# Patient Record
Sex: MALE | Race: WHITE | Employment: FULL TIME | URBAN - METROPOLITAN AREA
[De-identification: names, ages, dates, MRNs, and addresses within clinical notes are randomized per-mention and may not be internally consistent; named-entity substitution may affect disease eponyms.]

---

## 2018-08-29 ENCOUNTER — APPOINTMENT (OUTPATIENT)
Dept: GENERAL RADIOLOGY | Age: 63
End: 2018-08-29
Attending: EMERGENCY MEDICINE
Payer: OTHER MISCELLANEOUS

## 2018-08-29 ENCOUNTER — HOSPITAL ENCOUNTER (EMERGENCY)
Age: 63
Discharge: HOME | End: 2018-08-29
Attending: EMERGENCY MEDICINE | Admitting: EMERGENCY MEDICINE
Payer: OTHER MISCELLANEOUS

## 2018-08-29 VITALS
HEIGHT: 76 IN | TEMPERATURE: 98 F | SYSTOLIC BLOOD PRESSURE: 118 MMHG | DIASTOLIC BLOOD PRESSURE: 68 MMHG | OXYGEN SATURATION: 93 % | RESPIRATION RATE: 18 BRPM | BODY MASS INDEX: 32.71 KG/M2 | HEART RATE: 76 BPM | WEIGHT: 268.6 LBS
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
## 2018-08-29 DIAGNOSIS — M54.2 NECK PAIN: Primary | ICD-10-CM

## 2018-08-29 PROCEDURE — 72050 X-RAY EXAM NECK SPINE 4/5VWS: CPT

## 2018-08-29 PROCEDURE — 99282 EMERGENCY DEPT VISIT SF MDM: CPT
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
## 2023-11-12 ENCOUNTER — APPOINTMENT (OUTPATIENT)
Facility: HOSPITAL | Age: 68
DRG: 308 | End: 2023-11-12
Payer: MEDICARE

## 2023-11-12 ENCOUNTER — HOSPITAL ENCOUNTER (INPATIENT)
Facility: HOSPITAL | Age: 68
LOS: 4 days | Discharge: NURSING, REHAB OR LONG-TERM CARE | DRG: 308 | End: 2023-11-16
Attending: EMERGENCY MEDICINE | Admitting: HOSPITALIST
Payer: MEDICARE

## 2023-11-12 DIAGNOSIS — I48.91 ATRIAL FIBRILLATION, UNSPECIFIED TYPE (HCC): ICD-10-CM

## 2023-11-12 DIAGNOSIS — J44.1 COPD EXACERBATION (HCC): ICD-10-CM

## 2023-11-12 DIAGNOSIS — I48.91 ATRIAL FIBRILLATION WITH RAPID VENTRICULAR RESPONSE (HCC): Primary | ICD-10-CM

## 2023-11-12 DIAGNOSIS — I48.20 CHRONIC ATRIAL FIBRILLATION WITH RVR (HCC): ICD-10-CM

## 2023-11-12 DIAGNOSIS — R09.02 HYPOXEMIA: ICD-10-CM

## 2023-11-12 LAB
ALBUMIN SERPL-MCNC: 2.6 G/DL (ref 3.5–5)
ALBUMIN/GLOB SERPL: 0.7 (ref 1.1–2.2)
ALP SERPL-CCNC: 85 U/L (ref 45–117)
ALT SERPL-CCNC: 43 U/L (ref 12–78)
ANION GAP SERPL CALC-SCNC: ABNORMAL MMOL/L (ref 5–15)
ANION GAP SERPL CALC-SCNC: ABNORMAL MMOL/L (ref 5–15)
ARTERIAL PATENCY WRIST A: POSITIVE
ARTERIAL PATENCY WRIST A: YES
AST SERPL-CCNC: 24 U/L (ref 15–37)
BASE EXCESS BLD CALC-SCNC: 24 MMOL/L
BASE EXCESS BLDA CALC-SCNC: 26.1 MMOL/L
BASOPHILS # BLD: 0 K/UL (ref 0–0.1)
BASOPHILS NFR BLD: 0 % (ref 0–1)
BDY SITE: ABNORMAL
BDY SITE: ABNORMAL
BILIRUB SERPL-MCNC: 0.9 MG/DL (ref 0.2–1)
BUN SERPL-MCNC: 24 MG/DL (ref 6–20)
BUN SERPL-MCNC: 24 MG/DL (ref 6–20)
BUN/CREAT SERPL: 30 (ref 12–20)
BUN/CREAT SERPL: 31 (ref 12–20)
CALCIUM SERPL-MCNC: 8.9 MG/DL (ref 8.5–10.1)
CALCIUM SERPL-MCNC: 9.2 MG/DL (ref 8.5–10.1)
CHLORIDE SERPL-SCNC: 74 MMOL/L (ref 97–108)
CHLORIDE SERPL-SCNC: 74 MMOL/L (ref 97–108)
CO2 SERPL-SCNC: >45 MMOL/L (ref 21–32)
CO2 SERPL-SCNC: >45 MMOL/L (ref 21–32)
COMMENT:: NORMAL
CREAT SERPL-MCNC: 0.78 MG/DL (ref 0.7–1.3)
CREAT SERPL-MCNC: 0.81 MG/DL (ref 0.7–1.3)
DIFFERENTIAL METHOD BLD: ABNORMAL
EKG DIAGNOSIS: NORMAL
EKG Q-T INTERVAL: 330 MS
EKG QRS DURATION: 136 MS
EKG QTC CALCULATION (BAZETT): 523 MS
EKG R AXIS: 261 DEGREES
EKG T AXIS: 39 DEGREES
EKG VENTRICULAR RATE: 151 BPM
EOSINOPHIL # BLD: 0.1 K/UL (ref 0–0.4)
EOSINOPHIL NFR BLD: 1 % (ref 0–7)
ERYTHROCYTE [DISTWIDTH] IN BLOOD BY AUTOMATED COUNT: 14.5 % (ref 11.5–14.5)
FIO2 ON VENT: 40 %
GAS FLOW.O2 O2 DELIVERY SYS: ABNORMAL
GLOBULIN SER CALC-MCNC: 4 G/DL (ref 2–4)
GLUCOSE SERPL-MCNC: 133 MG/DL (ref 65–100)
GLUCOSE SERPL-MCNC: 134 MG/DL (ref 65–100)
HCO3 BLD-SCNC: 51.7 MMOL/L (ref 22–26)
HCO3 BLDA-SCNC: 54 MMOL/L (ref 22–26)
HCT VFR BLD AUTO: 39.6 % (ref 36.6–50.3)
HGB BLD-MCNC: 12.3 G/DL (ref 12.1–17)
IMM GRANULOCYTES # BLD AUTO: 0.1 K/UL (ref 0–0.04)
IMM GRANULOCYTES NFR BLD AUTO: 1 % (ref 0–0.5)
IPAP/PIP: 8
LACTATE SERPL-SCNC: 1.8 MMOL/L (ref 0.4–2)
LYMPHOCYTES # BLD: 1.4 K/UL (ref 0.8–3.5)
LYMPHOCYTES NFR BLD: 11 % (ref 12–49)
MAGNESIUM SERPL-MCNC: 1.6 MG/DL (ref 1.6–2.4)
MCH RBC QN AUTO: 31.8 PG (ref 26–34)
MCHC RBC AUTO-ENTMCNC: 31.1 G/DL (ref 30–36.5)
MCV RBC AUTO: 102.3 FL (ref 80–99)
MONOCYTES # BLD: 0.7 K/UL (ref 0–1)
MONOCYTES NFR BLD: 6 % (ref 5–13)
NEUTS SEG # BLD: 10 K/UL (ref 1.8–8)
NEUTS SEG NFR BLD: 81 % (ref 32–75)
NRBC # BLD: 0 K/UL (ref 0–0.01)
NRBC BLD-RTO: 0 PER 100 WBC
NT PRO BNP: 1876 PG/ML
O2/TOTAL GAS SETTING VFR VENT: 6 %
PCO2 BLD: 62.8 MMHG (ref 35–45)
PCO2 BLDA: 66 MMHG (ref 35–45)
PEEP MAX SETTING VENT: 18
PH BLD: 7.52 (ref 7.35–7.45)
PH BLDA: 7.53 (ref 7.35–7.45)
PLATELET # BLD AUTO: 401 K/UL (ref 150–400)
PLATELET COMMENT: ABNORMAL
PMV BLD AUTO: 9.2 FL (ref 8.9–12.9)
PO2 BLD: 70 MMHG (ref 80–100)
PO2 BLDA: 76 MMHG (ref 80–100)
POTASSIUM SERPL-SCNC: 2.4 MMOL/L (ref 3.5–5.1)
POTASSIUM SERPL-SCNC: 3.1 MMOL/L (ref 3.5–5.1)
PROT SERPL-MCNC: 6.6 G/DL (ref 6.4–8.2)
RBC # BLD AUTO: 3.87 M/UL (ref 4.1–5.7)
RBC MORPH BLD: ABNORMAL
RBC MORPH BLD: ABNORMAL
SAO2 % BLD: 94.7 % (ref 92–97)
SAO2 % BLD: 96 % (ref 92–97)
SAO2% DEVICE SAO2% SENSOR NAME: ABNORMAL
SODIUM SERPL-SCNC: 132 MMOL/L (ref 136–145)
SODIUM SERPL-SCNC: 133 MMOL/L (ref 136–145)
SPECIMEN HOLD: NORMAL
SPECIMEN SITE: ABNORMAL
SPECIMEN TYPE: ABNORMAL
TROPONIN I SERPL HS-MCNC: 15 NG/L (ref 0–76)
VENTILATION MODE VENT: ABNORMAL
WBC # BLD AUTO: 12.3 K/UL (ref 4.1–11.1)

## 2023-11-12 PROCEDURE — 99285 EMERGENCY DEPT VISIT HI MDM: CPT

## 2023-11-12 PROCEDURE — 5A09357 ASSISTANCE WITH RESPIRATORY VENTILATION, LESS THAN 24 CONSECUTIVE HOURS, CONTINUOUS POSITIVE AIRWAY PRESSURE: ICD-10-PCS | Performed by: HOSPITALIST

## 2023-11-12 PROCEDURE — 51798 US URINE CAPACITY MEASURE: CPT

## 2023-11-12 PROCEDURE — 94660 CPAP INITIATION&MGMT: CPT

## 2023-11-12 PROCEDURE — 94640 AIRWAY INHALATION TREATMENT: CPT

## 2023-11-12 PROCEDURE — 6360000002 HC RX W HCPCS: Performed by: NURSE PRACTITIONER

## 2023-11-12 PROCEDURE — 85025 COMPLETE CBC W/AUTO DIFF WBC: CPT

## 2023-11-12 PROCEDURE — 6360000002 HC RX W HCPCS: Performed by: EMERGENCY MEDICINE

## 2023-11-12 PROCEDURE — 2580000003 HC RX 258: Performed by: EMERGENCY MEDICINE

## 2023-11-12 PROCEDURE — 6370000000 HC RX 637 (ALT 250 FOR IP): Performed by: NURSE PRACTITIONER

## 2023-11-12 PROCEDURE — 2000000000 HC ICU R&B

## 2023-11-12 PROCEDURE — 2500000003 HC RX 250 WO HCPCS

## 2023-11-12 PROCEDURE — 36600 WITHDRAWAL OF ARTERIAL BLOOD: CPT

## 2023-11-12 PROCEDURE — 83735 ASSAY OF MAGNESIUM: CPT

## 2023-11-12 PROCEDURE — 2500000003 HC RX 250 WO HCPCS: Performed by: NURSE PRACTITIONER

## 2023-11-12 PROCEDURE — 2700000000 HC OXYGEN THERAPY PER DAY

## 2023-11-12 PROCEDURE — 84484 ASSAY OF TROPONIN QUANT: CPT

## 2023-11-12 PROCEDURE — 71045 X-RAY EXAM CHEST 1 VIEW: CPT

## 2023-11-12 PROCEDURE — 2580000003 HC RX 258: Performed by: NURSE PRACTITIONER

## 2023-11-12 PROCEDURE — 82803 BLOOD GASES ANY COMBINATION: CPT

## 2023-11-12 PROCEDURE — 83605 ASSAY OF LACTIC ACID: CPT

## 2023-11-12 PROCEDURE — 87040 BLOOD CULTURE FOR BACTERIA: CPT

## 2023-11-12 PROCEDURE — 83880 ASSAY OF NATRIURETIC PEPTIDE: CPT

## 2023-11-12 PROCEDURE — 36415 COLL VENOUS BLD VENIPUNCTURE: CPT

## 2023-11-12 PROCEDURE — 80053 COMPREHEN METABOLIC PANEL: CPT

## 2023-11-12 PROCEDURE — 96374 THER/PROPH/DIAG INJ IV PUSH: CPT

## 2023-11-12 PROCEDURE — 94664 DEMO&/EVAL PT USE INHALER: CPT

## 2023-11-12 PROCEDURE — 2500000003 HC RX 250 WO HCPCS: Performed by: EMERGENCY MEDICINE

## 2023-11-12 RX ORDER — SENNOSIDES A AND B 8.6 MG/1
1 TABLET, FILM COATED ORAL NIGHTLY
Status: DISCONTINUED | OUTPATIENT
Start: 2023-11-12 | End: 2023-11-16 | Stop reason: HOSPADM

## 2023-11-12 RX ORDER — PREDNISONE 20 MG/1
20 TABLET ORAL DAILY
COMMUNITY
Start: 2023-11-12 | End: 2023-11-12 | Stop reason: ALTCHOICE

## 2023-11-12 RX ORDER — MAGNESIUM SULFATE IN WATER 40 MG/ML
2000 INJECTION, SOLUTION INTRAVENOUS PRN
Status: DISCONTINUED | OUTPATIENT
Start: 2023-11-12 | End: 2023-11-16 | Stop reason: HOSPADM

## 2023-11-12 RX ORDER — METOPROLOL TARTRATE 75 MG/1
75 TABLET, FILM COATED ORAL 2 TIMES DAILY
COMMUNITY

## 2023-11-12 RX ORDER — LEVOFLOXACIN 5 MG/ML
500 INJECTION, SOLUTION INTRAVENOUS EVERY 24 HOURS
Status: DISCONTINUED | OUTPATIENT
Start: 2023-11-12 | End: 2023-11-13

## 2023-11-12 RX ORDER — METOPROLOL TARTRATE 1 MG/ML
2.5 INJECTION, SOLUTION INTRAVENOUS ONCE
Status: COMPLETED | OUTPATIENT
Start: 2023-11-12 | End: 2023-11-12

## 2023-11-12 RX ORDER — FUROSEMIDE 80 MG
80 TABLET ORAL 2 TIMES DAILY
COMMUNITY

## 2023-11-12 RX ORDER — IPRATROPIUM BROMIDE AND ALBUTEROL SULFATE 2.5; .5 MG/3ML; MG/3ML
1 SOLUTION RESPIRATORY (INHALATION)
Status: DISCONTINUED | OUTPATIENT
Start: 2023-11-12 | End: 2023-11-12

## 2023-11-12 RX ORDER — POTASSIUM CHLORIDE 7.45 MG/ML
10 INJECTION INTRAVENOUS PRN
Status: DISCONTINUED | OUTPATIENT
Start: 2023-11-12 | End: 2023-11-16 | Stop reason: HOSPADM

## 2023-11-12 RX ORDER — POLYETHYLENE GLYCOL 3350 17 G/17G
17 POWDER, FOR SOLUTION ORAL DAILY
Status: DISCONTINUED | OUTPATIENT
Start: 2023-11-13 | End: 2023-11-16 | Stop reason: HOSPADM

## 2023-11-12 RX ORDER — ONDANSETRON 2 MG/ML
4 INJECTION INTRAMUSCULAR; INTRAVENOUS EVERY 6 HOURS PRN
Status: DISCONTINUED | OUTPATIENT
Start: 2023-11-12 | End: 2023-11-16 | Stop reason: HOSPADM

## 2023-11-12 RX ORDER — PREDNISONE 20 MG/1
40 TABLET ORAL DAILY
Status: COMPLETED | OUTPATIENT
Start: 2023-11-14 | End: 2023-11-16

## 2023-11-12 RX ORDER — ENOXAPARIN SODIUM 100 MG/ML
40 INJECTION SUBCUTANEOUS DAILY
Status: DISCONTINUED | OUTPATIENT
Start: 2023-11-12 | End: 2023-11-12

## 2023-11-12 RX ORDER — MAGNESIUM SULFATE IN WATER 40 MG/ML
2000 INJECTION, SOLUTION INTRAVENOUS ONCE
Status: COMPLETED | OUTPATIENT
Start: 2023-11-12 | End: 2023-11-12

## 2023-11-12 RX ORDER — POLYETHYLENE GLYCOL 3350 17 G/17G
17 POWDER, FOR SOLUTION ORAL DAILY
COMMUNITY

## 2023-11-12 RX ORDER — ACETAMINOPHEN 325 MG/1
650 TABLET ORAL EVERY 6 HOURS PRN
Status: DISCONTINUED | OUTPATIENT
Start: 2023-11-12 | End: 2023-11-16 | Stop reason: HOSPADM

## 2023-11-12 RX ORDER — POTASSIUM CHLORIDE 750 MG/1
40 TABLET, FILM COATED, EXTENDED RELEASE ORAL ONCE
Status: COMPLETED | OUTPATIENT
Start: 2023-11-12 | End: 2023-11-12

## 2023-11-12 RX ORDER — METOPROLOL TARTRATE 1 MG/ML
5 INJECTION, SOLUTION INTRAVENOUS ONCE
Status: COMPLETED | OUTPATIENT
Start: 2023-11-12 | End: 2023-11-12

## 2023-11-12 RX ORDER — SERTRALINE HYDROCHLORIDE 25 MG/1
25 TABLET, FILM COATED ORAL DAILY
COMMUNITY

## 2023-11-12 RX ORDER — POTASSIUM CHLORIDE 1500 MG/1
20 TABLET, EXTENDED RELEASE ORAL DAILY
COMMUNITY
Start: 2023-11-12 | End: 2023-11-12 | Stop reason: ALTCHOICE

## 2023-11-12 RX ORDER — ALBUTEROL SULFATE 2.5 MG/3ML
2.5 SOLUTION RESPIRATORY (INHALATION) EVERY 4 HOURS PRN
COMMUNITY

## 2023-11-12 RX ORDER — GUAIFENESIN 400 MG/1
800 TABLET ORAL EVERY 8 HOURS
COMMUNITY

## 2023-11-12 RX ORDER — FLUTICASONE FUROATE, UMECLIDINIUM BROMIDE AND VILANTEROL TRIFENATATE 200; 62.5; 25 UG/1; UG/1; UG/1
1 POWDER RESPIRATORY (INHALATION) DAILY
COMMUNITY

## 2023-11-12 RX ORDER — ACETAMINOPHEN 500 MG
1000 TABLET ORAL EVERY 8 HOURS PRN
COMMUNITY

## 2023-11-12 RX ORDER — POLYETHYLENE GLYCOL 3350 17 G/17G
17 POWDER, FOR SOLUTION ORAL DAILY PRN
Status: DISCONTINUED | OUTPATIENT
Start: 2023-11-12 | End: 2023-11-16 | Stop reason: HOSPADM

## 2023-11-12 RX ORDER — SODIUM CHLORIDE 9 MG/ML
INJECTION, SOLUTION INTRAVENOUS PRN
Status: DISCONTINUED | OUTPATIENT
Start: 2023-11-12 | End: 2023-11-16 | Stop reason: HOSPADM

## 2023-11-12 RX ORDER — CHOLECALCIFEROL (VITAMIN D3) 125 MCG
5 CAPSULE ORAL
COMMUNITY

## 2023-11-12 RX ORDER — POTASSIUM CHLORIDE 7.45 MG/ML
10 INJECTION INTRAVENOUS ONCE
Status: COMPLETED | OUTPATIENT
Start: 2023-11-12 | End: 2023-11-12

## 2023-11-12 RX ORDER — ASPIRIN 81 MG/1
81 TABLET, CHEWABLE ORAL DAILY
Status: DISCONTINUED | OUTPATIENT
Start: 2023-11-13 | End: 2023-11-16 | Stop reason: HOSPADM

## 2023-11-12 RX ORDER — IPRATROPIUM BROMIDE AND ALBUTEROL SULFATE 2.5; .5 MG/3ML; MG/3ML
1 SOLUTION RESPIRATORY (INHALATION) EVERY 4 HOURS PRN
Status: DISCONTINUED | OUTPATIENT
Start: 2023-11-12 | End: 2023-11-13

## 2023-11-12 RX ORDER — IPRATROPIUM BROMIDE AND ALBUTEROL SULFATE 2.5; .5 MG/3ML; MG/3ML
1 SOLUTION RESPIRATORY (INHALATION) EVERY 8 HOURS
COMMUNITY
Start: 2023-10-27

## 2023-11-12 RX ORDER — METOPROLOL TARTRATE 1 MG/ML
INJECTION, SOLUTION INTRAVENOUS
Status: COMPLETED
Start: 2023-11-12 | End: 2023-11-12

## 2023-11-12 RX ORDER — TRAZODONE HYDROCHLORIDE 50 MG/1
50 TABLET ORAL NIGHTLY
Status: DISCONTINUED | OUTPATIENT
Start: 2023-11-12 | End: 2023-11-16 | Stop reason: HOSPADM

## 2023-11-12 RX ORDER — ACETAMINOPHEN 650 MG/1
650 SUPPOSITORY RECTAL EVERY 6 HOURS PRN
Status: DISCONTINUED | OUTPATIENT
Start: 2023-11-12 | End: 2023-11-16 | Stop reason: HOSPADM

## 2023-11-12 RX ORDER — LANOLIN ALCOHOL/MO/W.PET/CERES
6 CREAM (GRAM) TOPICAL
Status: DISCONTINUED | OUTPATIENT
Start: 2023-11-12 | End: 2023-11-16 | Stop reason: HOSPADM

## 2023-11-12 RX ORDER — ASPIRIN 81 MG/1
81 TABLET ORAL DAILY
COMMUNITY

## 2023-11-12 RX ORDER — POTASSIUM CHLORIDE 7.45 MG/ML
10 INJECTION INTRAVENOUS
Status: COMPLETED | OUTPATIENT
Start: 2023-11-12 | End: 2023-11-12

## 2023-11-12 RX ORDER — DOCUSATE SODIUM 100 MG/1
100 CAPSULE, LIQUID FILLED ORAL 2 TIMES DAILY PRN
COMMUNITY

## 2023-11-12 RX ORDER — SODIUM CHLORIDE 0.9 % (FLUSH) 0.9 %
5-40 SYRINGE (ML) INJECTION EVERY 12 HOURS SCHEDULED
Status: DISCONTINUED | OUTPATIENT
Start: 2023-11-12 | End: 2023-11-16 | Stop reason: HOSPADM

## 2023-11-12 RX ORDER — ONDANSETRON 4 MG/1
4 TABLET, ORALLY DISINTEGRATING ORAL EVERY 8 HOURS PRN
Status: DISCONTINUED | OUTPATIENT
Start: 2023-11-12 | End: 2023-11-16 | Stop reason: HOSPADM

## 2023-11-12 RX ORDER — ALBUTEROL SULFATE 2.5 MG/3ML
2.5 SOLUTION RESPIRATORY (INHALATION)
Status: DISCONTINUED | OUTPATIENT
Start: 2023-11-12 | End: 2023-11-13

## 2023-11-12 RX ORDER — SENNOSIDES A AND B 8.6 MG/1
2 TABLET, FILM COATED ORAL
COMMUNITY

## 2023-11-12 RX ORDER — POTASSIUM CHLORIDE 29.8 MG/ML
20 INJECTION INTRAVENOUS PRN
Status: DISCONTINUED | OUTPATIENT
Start: 2023-11-12 | End: 2023-11-16 | Stop reason: HOSPADM

## 2023-11-12 RX ORDER — TRAZODONE HYDROCHLORIDE 50 MG/1
50 TABLET ORAL NIGHTLY
COMMUNITY

## 2023-11-12 RX ORDER — SODIUM CHLORIDE 0.9 % (FLUSH) 0.9 %
5-40 SYRINGE (ML) INJECTION PRN
Status: DISCONTINUED | OUTPATIENT
Start: 2023-11-12 | End: 2023-11-16 | Stop reason: HOSPADM

## 2023-11-12 RX ORDER — METOPROLOL TARTRATE 50 MG/1
75 TABLET, FILM COATED ORAL 2 TIMES DAILY
Status: DISCONTINUED | OUTPATIENT
Start: 2023-11-12 | End: 2023-11-14

## 2023-11-12 RX ADMIN — ALBUTEROL SULFATE 2.5 MG: 2.5 SOLUTION RESPIRATORY (INHALATION) at 19:41

## 2023-11-12 RX ADMIN — METOPROLOL TARTRATE 5 MG: 1 INJECTION, SOLUTION INTRAVENOUS at 15:04

## 2023-11-12 RX ADMIN — SODIUM CHLORIDE, PRESERVATIVE FREE 10 ML: 5 INJECTION INTRAVENOUS at 20:32

## 2023-11-12 RX ADMIN — WATER 1000 MG: 1 INJECTION INTRAMUSCULAR; INTRAVENOUS; SUBCUTANEOUS at 14:33

## 2023-11-12 RX ADMIN — AMIODARONE HYDROCHLORIDE 150 MG: 50 INJECTION, SOLUTION INTRAVENOUS at 22:13

## 2023-11-12 RX ADMIN — POTASSIUM CHLORIDE 10 MEQ: 7.46 INJECTION, SOLUTION INTRAVENOUS at 13:22

## 2023-11-12 RX ADMIN — ARFORMOTEROL TARTRATE: 15 SOLUTION RESPIRATORY (INHALATION) at 19:41

## 2023-11-12 RX ADMIN — METOPROLOL TARTRATE 2.5 MG: 1 INJECTION, SOLUTION INTRAVENOUS at 20:28

## 2023-11-12 RX ADMIN — APIXABAN 5 MG: 5 TABLET, FILM COATED ORAL at 22:55

## 2023-11-12 RX ADMIN — POTASSIUM CHLORIDE 40 MEQ: 750 TABLET, FILM COATED, EXTENDED RELEASE ORAL at 16:36

## 2023-11-12 RX ADMIN — POTASSIUM CHLORIDE 10 MEQ: 10 INJECTION, SOLUTION INTRAVENOUS at 14:33

## 2023-11-12 RX ADMIN — ALBUTEROL SULFATE 2.5 MG: 2.5 SOLUTION RESPIRATORY (INHALATION) at 15:06

## 2023-11-12 RX ADMIN — IPRATROPIUM BROMIDE 0.5 MG: 0.5 SOLUTION RESPIRATORY (INHALATION) at 19:41

## 2023-11-12 RX ADMIN — LEVOFLOXACIN 500 MG: 500 INJECTION, SOLUTION INTRAVENOUS at 15:43

## 2023-11-12 RX ADMIN — POTASSIUM CHLORIDE 40 MEQ: 750 TABLET, FILM COATED, EXTENDED RELEASE ORAL at 13:14

## 2023-11-12 RX ADMIN — METHYLPREDNISOLONE SODIUM SUCCINATE 40 MG: 40 INJECTION, POWDER, FOR SOLUTION INTRAMUSCULAR; INTRAVENOUS at 13:14

## 2023-11-12 RX ADMIN — METHYLPREDNISOLONE SODIUM SUCCINATE 40 MG: 40 INJECTION, POWDER, FOR SOLUTION INTRAMUSCULAR; INTRAVENOUS at 17:40

## 2023-11-12 RX ADMIN — METOPROLOL TARTRATE 2.5 MG: 5 INJECTION INTRAVENOUS at 20:28

## 2023-11-12 RX ADMIN — MAGNESIUM SULFATE HEPTAHYDRATE 2000 MG: 40 INJECTION, SOLUTION INTRAVENOUS at 14:42

## 2023-11-12 RX ADMIN — DILTIAZEM HYDROCHLORIDE 5 MG/HR: 5 INJECTION, SOLUTION INTRAVENOUS at 11:28

## 2023-11-12 RX ADMIN — METOPROLOL TARTRATE 5 MG: 5 INJECTION INTRAVENOUS at 13:14

## 2023-11-12 ASSESSMENT — ENCOUNTER SYMPTOMS
SINUS PRESSURE: 0
NAUSEA: 0
RHINORRHEA: 0
DIARRHEA: 0
BLOOD IN STOOL: 0
ABDOMINAL PAIN: 0
COUGH: 0
STRIDOR: 0
WHEEZING: 1
CHEST TIGHTNESS: 0
WHEEZING: 0
SINUS PAIN: 0
BACK PAIN: 0
TROUBLE SWALLOWING: 0
COLOR CHANGE: 0
SHORTNESS OF BREATH: 1
VOMITING: 0
SORE THROAT: 0
EYES NEGATIVE: 1

## 2023-11-12 ASSESSMENT — PAIN SCALES - GENERAL
PAINLEVEL_OUTOF10: 0
PAINLEVEL_OUTOF10: 0

## 2023-11-12 ASSESSMENT — PAIN - FUNCTIONAL ASSESSMENT: PAIN_FUNCTIONAL_ASSESSMENT: NONE - DENIES PAIN

## 2023-11-12 NOTE — ED TRIAGE NOTES
Pt arrives from [facility] via EMS for SOB, pt began experiencing SOB at 2am. Per EMS, pt received an unknown amount of lasix from nursing home and one duoneb en route. Pt O2 sat upon arrival was 85% on his normal 3L. Pt also in afib with RVR on monitor, pt denies chest pain, states his SOB has improved.

## 2023-11-12 NOTE — PROGRESS NOTES
11/12/23 1200   NIV Type   Ventilator ID [id number]   NIV Started/Stopped On   Equipment Type v60   Mode Bilevel   Mask Type Full face mask   Mask Size Medium   Assessment   Pulse (!) 150   Respirations (!) 34   SpO2 97 %   Settings/Measurements   PIP Observed 19 cm H20   IPAP 18 cmH20   CPAP/EPAP 8 cmH2O   Vt (Measured) 522 mL   Rate Ordered 16   Insp Rise Time (%) 1 %   FiO2  40 %   Minute Volume (L/min) 16.2 Liters   Mask Leak (lpm) 13 lpm   Patient's Home Machine No   Alarm Settings   Alarms On Y   Low Pressure (cmH2O) 5 cmH2O   High Pressure (cmH2O) 30 cmH2O   Apnea (secs) 20 secs   RR Low (bpm) 8   RR High (bpm) 40 br/min

## 2023-11-12 NOTE — H&P
CRITICAL CARE ADMISSION NOTE      Name: Cecilia Zamora   : 1955   MRN: [id number]   Date: 2023      REASON FOR ICU ADMISSION:  Acute on chronic hypoxic/hypercapnic respiratory failure     PRINCIPAL ICU DIAGNOSIS   Acute on chronic hypoxic/hypercapnic respiratory failure     BRIEF PATIENT SUMMARY   Radha Suazo is a 59-year-old male with PMHx of COPD, HF (LVEF 40-45%), A-fib, who presented to ED with complaints of increasing shortness of breath and palpitations over the last several days. Of note, he was admitted to [facility] 10/13 - 10/25 for COPD exacerbation where he required intubation. Upon arrival to ED, he was found to be in rapid A-fib. Diltiazem drip started. Patient is on metoprolol at rehab facility. He is on nasal cannula at baseline. ABG in ED 7.. He was placed on BiPAP to assist in his increased work of breathing and shortness of breath. CXR showing cardiomegaly with bilateral pleural effusions. ICU was consulted for admission. Given his need for an IV and rapid A-fib, he will be admitted to the ICU for further management.     COMPREHENSIVE ASSESSMENT & PLAN:SYSTEM BASED     24 HOUR EVENTS: As above    NEUROLOGICAL:  Depression  Insomnia   Close neurologic monitoring  Home sertraline  Home melatonin, trazodone    PULMONOLOGY:  Acute on chronic hypoxic/hypercapnic respiratory failure   COPD Exacerbation   BiPAP, on  40% upon admission  4L NC at baseline, nocturnal CPAP  Home Trelegy Ellipta, scheduled albuterol  Steroid taper  CXR: Cardiomegaly with bilateral pleural effusions  Diuresis as below  ABX as below  Aspiration precautions    CARDIOVASCULAR:  HF (LVEF 40-45%)   Tele  MAP goal > 65  TTE pending  IV metoprolol for rapid A-fib  We will resume home metoprolol  Continue home Eliquis  Aspirin    GASTROINTESTINAL   NPO  Home miralax, senna    RENAL/ELECTROLYTE/FLUIDS:  ?Contraction alkalosis  Metabolic alkalosis  Hypokalemia  Hyponatremia  Hypomagnesemia   Daily

## 2023-11-12 NOTE — PROGRESS NOTES
Admission Medication Reconciliation:    Information obtained from:  transfer papers from [address]: Yes    Comments/Recommendations:     I updated patient's PTA medication list utilizing the paperwork provided by [facility]. The only medication on patient's PTA med list prior to this med rec was Trelegy Ellipta. All other medications are additions. Of note:  - Both prednisone 40mg daily x 5 days and potassium chloride 20 meq daily x 3 days were ordered to start today (11/12/23). I omitted this from PTA med list since these were new orders intended to start today prior to patient's transfer and admission to [address] [place] [address],[address]. - Per review of transfer paperwork, patient has been on scheduled Duo-Nebs q8h since 10/27/23. Order frequency was increased to q6h to start today (11/12/23) prior to transfer and admission to [facility] pharmacy benefit data reflects medications filled and processed through the patient's insurance, however   this data does NOT capture whether the medication was picked up or is currently being taken by the patient. Allergies:  Patient has no known allergies. Significant PMH/Disease States: No past medical history on file.   Chief Complaint for this Admission:    Chief Complaint   Patient presents with    Shortness of Breath     Prior to Admission Medications:   Prior to Admission Medications   Prescriptions Last Dose Informant   Metoprolol Tartrate 75 MG TABS  Transfer Papers/EMS   Sig: Take 75 mg by mouth in the morning and at bedtime   acetaminophen (TYLENOL) 500 MG tablet  Transfer Papers/EMS   Sig: Take 2 tablets by mouth every 8 hours as needed for Pain   albuterol (PROVENTIL) (2.5 MG/3ML) 0.083% nebulizer solution  Transfer Papers/EMS   Sig: Take 3 mLs by nebulization every 4 hours as needed for Wheezing   apixaban (ELIQUIS) 5 MG TABS tablet 11/12/2023 at 9 Transfer Papers/EMS   Sig: Take 1 tablet by mouth 2 times daily   aspirin 81 MG EC tablet

## 2023-11-13 LAB
ANION GAP SERPL CALC-SCNC: ABNORMAL MMOL/L (ref 5–15)
BASOPHILS # BLD: 0 K/UL (ref 0–0.1)
BASOPHILS NFR BLD: 0 % (ref 0–1)
BUN SERPL-MCNC: 25 MG/DL (ref 6–20)
BUN/CREAT SERPL: 29 (ref 12–20)
CALCIUM SERPL-MCNC: 8.8 MG/DL (ref 8.5–10.1)
CHLORIDE SERPL-SCNC: 77 MMOL/L (ref 97–108)
CO2 SERPL-SCNC: >45 MMOL/L (ref 21–32)
CREAT SERPL-MCNC: 0.87 MG/DL (ref 0.7–1.3)
DIFFERENTIAL METHOD BLD: ABNORMAL
EOSINOPHIL # BLD: 0 K/UL (ref 0–0.4)
EOSINOPHIL NFR BLD: 0 % (ref 0–7)
ERYTHROCYTE [DISTWIDTH] IN BLOOD BY AUTOMATED COUNT: 14.3 % (ref 11.5–14.5)
GLUCOSE BLD STRIP.AUTO-MCNC: 137 MG/DL (ref 65–117)
GLUCOSE BLD STRIP.AUTO-MCNC: 157 MG/DL (ref 65–117)
GLUCOSE BLD STRIP.AUTO-MCNC: 170 MG/DL (ref 65–117)
GLUCOSE SERPL-MCNC: 182 MG/DL (ref 65–100)
HCT VFR BLD AUTO: 37.4 % (ref 36.6–50.3)
HGB BLD-MCNC: 11.9 G/DL (ref 12.1–17)
IMM GRANULOCYTES # BLD AUTO: 0.1 K/UL (ref 0–0.04)
IMM GRANULOCYTES NFR BLD AUTO: 1 % (ref 0–0.5)
LYMPHOCYTES # BLD: 0.4 K/UL (ref 0.8–3.5)
LYMPHOCYTES NFR BLD: 3 % (ref 12–49)
MAGNESIUM SERPL-MCNC: 2.1 MG/DL (ref 1.6–2.4)
MCH RBC QN AUTO: 32.2 PG (ref 26–34)
MCHC RBC AUTO-ENTMCNC: 31.8 G/DL (ref 30–36.5)
MCV RBC AUTO: 101.4 FL (ref 80–99)
MONOCYTES # BLD: 0.5 K/UL (ref 0–1)
MONOCYTES NFR BLD: 4 % (ref 5–13)
NEUTS SEG # BLD: 11.7 K/UL (ref 1.8–8)
NEUTS SEG NFR BLD: 92 % (ref 32–75)
NRBC # BLD: 0 K/UL (ref 0–0.01)
NRBC BLD-RTO: 0 PER 100 WBC
PHOSPHATE SERPL-MCNC: 2 MG/DL (ref 2.6–4.7)
PLATELET # BLD AUTO: 366 K/UL (ref 150–400)
PLATELET COMMENT: ABNORMAL
PMV BLD AUTO: 9.4 FL (ref 8.9–12.9)
POTASSIUM SERPL-SCNC: 3.3 MMOL/L (ref 3.5–5.1)
PROCALCITONIN SERPL-MCNC: 0.19 NG/ML
RBC # BLD AUTO: 3.69 M/UL (ref 4.1–5.7)
RBC MORPH BLD: ABNORMAL
SERVICE CMNT-IMP: ABNORMAL
SODIUM SERPL-SCNC: 133 MMOL/L (ref 136–145)
WBC # BLD AUTO: 12.7 K/UL (ref 4.1–11.1)

## 2023-11-13 PROCEDURE — 6360000002 HC RX W HCPCS: Performed by: NURSE PRACTITIONER

## 2023-11-13 PROCEDURE — 94660 CPAP INITIATION&MGMT: CPT

## 2023-11-13 PROCEDURE — 6370000000 HC RX 637 (ALT 250 FOR IP): Performed by: NURSE PRACTITIONER

## 2023-11-13 PROCEDURE — 2500000003 HC RX 250 WO HCPCS: Performed by: NURSE PRACTITIONER

## 2023-11-13 PROCEDURE — 83735 ASSAY OF MAGNESIUM: CPT

## 2023-11-13 PROCEDURE — 94640 AIRWAY INHALATION TREATMENT: CPT

## 2023-11-13 PROCEDURE — 82962 GLUCOSE BLOOD TEST: CPT

## 2023-11-13 PROCEDURE — 97530 THERAPEUTIC ACTIVITIES: CPT

## 2023-11-13 PROCEDURE — 2000000000 HC ICU R&B

## 2023-11-13 PROCEDURE — 80048 BASIC METABOLIC PNL TOTAL CA: CPT

## 2023-11-13 PROCEDURE — 2580000003 HC RX 258: Performed by: NURSE PRACTITIONER

## 2023-11-13 PROCEDURE — 85025 COMPLETE CBC W/AUTO DIFF WBC: CPT

## 2023-11-13 PROCEDURE — 6370000000 HC RX 637 (ALT 250 FOR IP): Performed by: EMERGENCY MEDICINE

## 2023-11-13 PROCEDURE — 84145 PROCALCITONIN (PCT): CPT

## 2023-11-13 PROCEDURE — 97161 PT EVAL LOW COMPLEX 20 MIN: CPT

## 2023-11-13 PROCEDURE — 36415 COLL VENOUS BLD VENIPUNCTURE: CPT

## 2023-11-13 PROCEDURE — 84100 ASSAY OF PHOSPHORUS: CPT

## 2023-11-13 RX ORDER — CASTOR OIL AND BALSAM, PERU 788; 87 MG/G; MG/G
OINTMENT TOPICAL 2 TIMES DAILY
Status: DISCONTINUED | OUTPATIENT
Start: 2023-11-13 | End: 2023-11-16 | Stop reason: HOSPADM

## 2023-11-13 RX ORDER — AZITHROMYCIN 250 MG/1
500 TABLET, FILM COATED ORAL DAILY
Status: DISCONTINUED | OUTPATIENT
Start: 2023-11-13 | End: 2023-11-14

## 2023-11-13 RX ORDER — DEXTROSE MONOHYDRATE 100 MG/ML
INJECTION, SOLUTION INTRAVENOUS CONTINUOUS PRN
Status: DISCONTINUED | OUTPATIENT
Start: 2023-11-13 | End: 2023-11-16 | Stop reason: HOSPADM

## 2023-11-13 RX ORDER — ALBUTEROL SULFATE 2.5 MG/3ML
2.5 SOLUTION RESPIRATORY (INHALATION) EVERY 4 HOURS PRN
Status: DISCONTINUED | OUTPATIENT
Start: 2023-11-13 | End: 2023-11-14

## 2023-11-13 RX ORDER — METOPROLOL TARTRATE 1 MG/ML
5 INJECTION, SOLUTION INTRAVENOUS ONCE
Status: COMPLETED | OUTPATIENT
Start: 2023-11-13 | End: 2023-11-13

## 2023-11-13 RX ORDER — INSULIN LISPRO 100 [IU]/ML
0-4 INJECTION, SOLUTION INTRAVENOUS; SUBCUTANEOUS NIGHTLY
Status: DISCONTINUED | OUTPATIENT
Start: 2023-11-13 | End: 2023-11-16 | Stop reason: HOSPADM

## 2023-11-13 RX ORDER — POTASSIUM CHLORIDE 750 MG/1
40 TABLET, FILM COATED, EXTENDED RELEASE ORAL ONCE
Status: COMPLETED | OUTPATIENT
Start: 2023-11-13 | End: 2023-11-13

## 2023-11-13 RX ORDER — INSULIN LISPRO 100 [IU]/ML
0-4 INJECTION, SOLUTION INTRAVENOUS; SUBCUTANEOUS
Status: DISCONTINUED | OUTPATIENT
Start: 2023-11-13 | End: 2023-11-16 | Stop reason: HOSPADM

## 2023-11-13 RX ADMIN — CASTOR OIL AND BALSAM, PERU: 788; 87 OINTMENT TOPICAL at 20:22

## 2023-11-13 RX ADMIN — IPRATROPIUM BROMIDE 0.5 MG: 0.5 SOLUTION RESPIRATORY (INHALATION) at 08:28

## 2023-11-13 RX ADMIN — TRAZODONE HYDROCHLORIDE 50 MG: 50 TABLET ORAL at 20:14

## 2023-11-13 RX ADMIN — ARFORMOTEROL TARTRATE: 15 SOLUTION RESPIRATORY (INHALATION) at 20:35

## 2023-11-13 RX ADMIN — APIXABAN 5 MG: 5 TABLET, FILM COATED ORAL at 10:15

## 2023-11-13 RX ADMIN — DIBASIC SODIUM PHOSPHATE, MONOBASIC POTASSIUM PHOSPHATE AND MONOBASIC SODIUM PHOSPHATE 2 TABLET: 852; 155; 130 TABLET ORAL at 06:02

## 2023-11-13 RX ADMIN — SODIUM CHLORIDE, PRESERVATIVE FREE 10 ML: 5 INJECTION INTRAVENOUS at 10:16

## 2023-11-13 RX ADMIN — IPRATROPIUM BROMIDE 0.5 MG: 0.5 SOLUTION RESPIRATORY (INHALATION) at 00:44

## 2023-11-13 RX ADMIN — METHYLPREDNISOLONE SODIUM SUCCINATE 40 MG: 40 INJECTION, POWDER, FOR SOLUTION INTRAMUSCULAR; INTRAVENOUS at 06:06

## 2023-11-13 RX ADMIN — WATER 1000 MG: 1 INJECTION INTRAMUSCULAR; INTRAVENOUS; SUBCUTANEOUS at 17:00

## 2023-11-13 RX ADMIN — SERTRALINE HYDROCHLORIDE 25 MG: 50 TABLET ORAL at 10:15

## 2023-11-13 RX ADMIN — AZITHROMYCIN DIHYDRATE 500 MG: 250 TABLET, FILM COATED ORAL at 12:01

## 2023-11-13 RX ADMIN — SODIUM CHLORIDE, PRESERVATIVE FREE 10 ML: 5 INJECTION INTRAVENOUS at 20:21

## 2023-11-13 RX ADMIN — METOPROLOL TARTRATE 75 MG: 50 TABLET ORAL at 03:15

## 2023-11-13 RX ADMIN — ASPIRIN 81 MG CHEWABLE TABLET 81 MG: 81 TABLET CHEWABLE at 10:15

## 2023-11-13 RX ADMIN — ARFORMOTEROL TARTRATE: 15 SOLUTION RESPIRATORY (INHALATION) at 08:28

## 2023-11-13 RX ADMIN — IPRATROPIUM BROMIDE 0.5 MG: 0.5 SOLUTION RESPIRATORY (INHALATION) at 20:37

## 2023-11-13 RX ADMIN — Medication 6 MG: at 20:14

## 2023-11-13 RX ADMIN — METHYLPREDNISOLONE SODIUM SUCCINATE 40 MG: 40 INJECTION, POWDER, FOR SOLUTION INTRAMUSCULAR; INTRAVENOUS at 12:09

## 2023-11-13 RX ADMIN — POLYETHYLENE GLYCOL 3350 17 G: 17 POWDER, FOR SOLUTION ORAL at 10:16

## 2023-11-13 RX ADMIN — METHYLPREDNISOLONE SODIUM SUCCINATE 40 MG: 40 INJECTION, POWDER, FOR SOLUTION INTRAMUSCULAR; INTRAVENOUS at 00:08

## 2023-11-13 RX ADMIN — IPRATROPIUM BROMIDE 0.5 MG: 0.5 SOLUTION RESPIRATORY (INHALATION) at 14:47

## 2023-11-13 RX ADMIN — POTASSIUM CHLORIDE 40 MEQ: 750 TABLET, FILM COATED, EXTENDED RELEASE ORAL at 06:02

## 2023-11-13 RX ADMIN — CASTOR OIL AND BALSAM, PERU: 788; 87 OINTMENT TOPICAL at 12:12

## 2023-11-13 RX ADMIN — APIXABAN 5 MG: 5 TABLET, FILM COATED ORAL at 21:00

## 2023-11-13 RX ADMIN — METOPROLOL TARTRATE 75 MG: 50 TABLET ORAL at 14:04

## 2023-11-13 RX ADMIN — METOPROLOL TARTRATE 5 MG: 5 INJECTION INTRAVENOUS at 13:45

## 2023-11-13 RX ADMIN — ALBUTEROL SULFATE 2.5 MG: 2.5 SOLUTION RESPIRATORY (INHALATION) at 08:28

## 2023-11-13 RX ADMIN — POTASSIUM PHOSPHATE, MONOBASIC POTASSIUM PHOSPHATE, DIBASIC 20 MMOL: 224; 236 INJECTION, SOLUTION, CONCENTRATE INTRAVENOUS at 12:01

## 2023-11-13 RX ADMIN — METHYLPREDNISOLONE SODIUM SUCCINATE 40 MG: 40 INJECTION, POWDER, FOR SOLUTION INTRAMUSCULAR; INTRAVENOUS at 17:03

## 2023-11-13 NOTE — PROGRESS NOTES
1945: patient placed back on BIPAP at this time as his O2 sat was mid 80s sustained. RT notified. Patient is tachypneic to the 40s and using accessory muscles; I do not feel that he is safe to take his PO meds at this time. HR continues to be anywhere from 110s to 140. NP Analy notified. One time order for IV metoprolol received and PO order held. Will continue to monitor for improvement and will see if he is able to take his night time PO meds at a later time. 2135: No improvement in HR or tachypnea. Order received for amio bolus from PATRICK NP    0310: Notified NP that HR is still bouncing between 110s-120s. Per PATRICK NP, unhold PO metoprolol from earlier and give thr 75mg PO now. Retime AM dose.

## 2023-11-13 NOTE — PROGRESS NOTES
Occupational Therapy  11/13/23    Order acknowledged, chart reviewed. Patient noted to be tachycardic with -144 at rest. Discussed with PT who reports patient tachycardic with HR up to 170s with limited activity. Will defer OT evaluation and follow up tomorrow as able/appropriate.      Thank you,   Tate Kelly, OTD, OTR/L

## 2023-11-13 NOTE — WOUND CARE
WOCN Note:     New consult for sacral wound POA  Seen in 7105    Chart shows:  Admitted on 11/12/2023 from [facility]. Admitted for COPD exacerbation with pleural effusions, a-fib. History of BiPAP use    Assessment:   Appropriately conversational and reports no pain. Able to turn independently onto right and left sides for assessment. Wearing briefs. Surface: versacare foam mattress  Diet: regular    Bilateral heels intact and without erythema. Heels offloaded with pillows. Currently on NC with POA scar tissue on bridge of nose. 1. POA stage 3 pressure injury to left buttocks  6 x 2.4 x 0.3 cm  Non-blanching wound bed with thin layer of yellow tissue; open area is surrounded by non-blanching violet tissue with 2 small skip areas distally  no exudate  Tx: placed sacral foam dressing until Venelex is up from pharmacy      Wound Recommendations:    Left buttock: clean with saline, apply Venelex twice daily and cover with foam dressing. Change foam dressing as needed. PI Prevention:  Turn/reposition approximately every 2 hours  Offload heels with heels hanging off end of pillow at all times while in bed. Sacral Foam dressing: lift to assess regularly; change as needed. Discontinue if incontinence is frequently soiling dressing. Dr. Ashley Huitron aware of POA stage 3 pressure injury.     Transition of Care: Plan to follow weekly and as needed while admitted to hospital.     Reyna Gonzalez, ANDREIN, RN, [facility]  Certified Wound, Ostomy, Continence Nurse  office [phone]  Available via [facility]

## 2023-11-13 NOTE — PLAN OF CARE
Problem: Physical Therapy - Adult  Goal: By Discharge: Performs mobility at highest level of function for planned discharge setting. See evaluation for individualized goals. Description: FUNCTIONAL STATUS PRIOR TO ADMISSION: Patient admitted from snf where he was working with PT for stance and transfers; initially he was independent with O2    HOME SUPPORT PRIOR TO ADMISSION: The patient lived alone with no local support. Physical Therapy Goals  Initiated 11/13/2023  1. Patient will move from supine to sit and sit to supine in bed with contact guard assist within 7 day(s). 2.  Patient will perform sit to stand with minimal assistance within 7 day(s). 3.  Patient will transfer from bed to chair and chair to bed with minimal assistance using the least restrictive device within 7 day(s). 4.  Patient will ambulate with minimal assistance for 50 feet with the least restrictive device within 7 day(s). Outcome: Progressing       PHYSICAL THERAPY EVALUATION    Patient: Rafael Sanchez (46 y.o. male)  Date: 11/13/2023  Primary Diagnosis: COPD exacerbation ([address]) [J44.1]  Atrial fibrillation with rapid ventricular response ([address]) [I48.91]       Precautions: Fall Risk                    ASSESSMENT : Candelario Avery is a 75-year-old male with PMHx of COPD on 4L NC oxygen at baseline, HF (LVEF 40-45%), A-fib,  admitted to the ICU for acute on chronic hypoxic, hypercapnic respiratory failure and afib with RVR. DEFICITS/IMPAIRMENTS:   The patient is limited by decreased functional mobility, strength, activity tolerance, endurance, balance who would benefit from PT to improve functional mobility and safety. Patient admitted from snf where he was progressing to stance and transfers. Patient with heart rate initially at 115 that increased to 170 bpm when achieving stance- returned supine with nursing notified.  Heart rate reduced quickly with rest. Patient stated that he only felt some shortness of breath when heart rate

## 2023-11-13 NOTE — PROGRESS NOTES
CRITICAL CARE ADMISSION NOTE      Name: Corby Maza   : 1955   MRN: [id number]   Date: 2023      REASON FOR ICU ADMISSION:  Acute on chronic hypoxic/hypercapnic respiratory failure     PRINCIPAL ICU DIAGNOSIS   Acute on chronic hypoxic/hypercapnic respiratory failure     BRIEF PATIENT SUMMARY   Soni Hernandez is a 78-year-old male with PMHx of COPD on 4L NC oxygen at baseline, HF (LVEF 40-45%), A-fib, who presented to ED on  with complaints of increasing shortness of breath and palpitations over the last several days. Of note, he was admitted to [facility] 10/13 - 10/25 for COPD exacerbation where he required intubation. Upon arrival to ED, he was found to be in rapid A-fib. Diltiazem drip started. Patient is on metoprolol at rehab facility. ABG in ED 7.. He was placed on BiPAP to assist in his increased work of breathing and shortness of breath. CXR showing cardiomegaly with bilateral pleural effusions. ICU was consulted for admission. He was admitted to the ICU for acute on chronic hypoxic, hypercapnic respiratory failure and afib with RVR. He was on BiPAP 40% 8 overnight and has been weaned to mid-flow this am.  His afib is now rate controlled.      COMPREHENSIVE ASSESSMENT & PLAN:SYSTEM BASED     24 HOUR EVENTS: As above    NEUROLOGICAL:  Depression  Insomnia   Close neurologic monitoring  Home sertraline  Home melatonin, trazodone    PULMONOLOGY:  Acute on chronic hypoxic/hypercapnic respiratory failure   COPD Exacerbation   BiPAP on  40% at night  4L NC at baseline, nocturnal CPAP at home; plan for nocturnal BiPAP, above settings here  Wean Fio2 as tolerated here for goal sats 88-92%  Home Trelegy Ellipta, scheduled albuterol  Steroid taper  CXR: Cardiomegaly with bilateral pleural effusions  Holding diuresis today given alkalosis and euvolemic appearance   Ceftriaxone and azithromycin  Aspiration precautions    CARDIOVASCULAR:  HFrEF (LVEF 40-45%), not in

## 2023-11-13 NOTE — CARE COORDINATION
Care Management Initial Assessment       RUR:16% Moderate   Readmission? No  1st IM letter given? No     11/13/23 1006   Service Assessment   Patient Orientation Alert and Oriented;Person;Place;Situation;Self   Cognition Alert   History Provided By Patient   Primary Caregiver [address]  (Daughter Ed Toshia [phone])   [address] is: Legal Next of [address] [facility]   PCP Verified by CM Yes  (No PCP)   Last Visit to PCP Within last two years  (No PCP)   Prior Functional Level Assistance with the following:;Bathing;Dressing; Toileting;Mobility   Current Functional Level Assistance with the following:;Bathing;Dressing; Toileting;Mobility   Can patient return to prior living arrangement Unknown at present   Ability to make needs known: Good   Family able to assist with home care needs: No   Would you like for me to discuss the discharge plan with any other family members/significant others, and if so, who?  Yes  (Daughter Jluis Pope)   Financial Resources [facility] Resources None   Social/Functional History   Lives With Alone   Type of Home House   Home Layout Multi-level  (Tri level)   Home Access Level entry   [address] [place] [address] Help From Family   ADL Assistance Needs assistance   Bath Moderate assistance   Dressing Moderate assistance   Grooming Moderate assistance   Feeding Independent   Toileting Needs assistance   Ambulation Assistance Needs assistance   Transfer Assistance Needs assistance   Active  Yes  (active  prior to October)   Mode of Transportation Car   Occupation Retired   Discharge Planning   Type of [address] Prior To Admission [address] Medications No   Patient expects to be discharged to: [address] [place] [address]   (Tri level)   History of

## 2023-11-13 NOTE — PROGRESS NOTES
I participated in the IDT meeting where the plan of care for Reather Erika was discussed on [address]. I reviewed the patient's medical record prior to this meeting. We will continue to follow and visit for spiritual assessment when appropriate. Please contact  paging service for any immediate needs. _____________________________  Samy Murray M.Div., M.S., B.C.C.   Staff

## 2023-11-14 ENCOUNTER — APPOINTMENT (OUTPATIENT)
Facility: HOSPITAL | Age: 68
DRG: 308 | End: 2023-11-14
Payer: MEDICARE

## 2023-11-14 PROBLEM — Z71.89 GOALS OF CARE, COUNSELING/DISCUSSION: Status: ACTIVE | Noted: 2023-11-14

## 2023-11-14 PROBLEM — Z51.5 PALLIATIVE CARE ENCOUNTER: Status: ACTIVE | Noted: 2023-11-14

## 2023-11-14 PROBLEM — Z71.89 DNR (DO NOT RESUSCITATE) DISCUSSION: Status: ACTIVE | Noted: 2023-11-14

## 2023-11-14 LAB
ANION GAP SERPL CALC-SCNC: ABNORMAL MMOL/L (ref 5–15)
ANION GAP SERPL CALC-SCNC: ABNORMAL MMOL/L (ref 5–15)
BASOPHILS # BLD: 0 K/UL (ref 0–0.1)
BASOPHILS NFR BLD: 0 % (ref 0–1)
BUN SERPL-MCNC: 35 MG/DL (ref 6–20)
BUN SERPL-MCNC: 40 MG/DL (ref 6–20)
BUN/CREAT SERPL: 35 (ref 12–20)
BUN/CREAT SERPL: 37 (ref 12–20)
CALCIUM SERPL-MCNC: 8.7 MG/DL (ref 8.5–10.1)
CALCIUM SERPL-MCNC: 8.7 MG/DL (ref 8.5–10.1)
CHLORIDE SERPL-SCNC: 79 MMOL/L (ref 97–108)
CHLORIDE SERPL-SCNC: 82 MMOL/L (ref 97–108)
CO2 SERPL-SCNC: >45 MMOL/L (ref 21–32)
CO2 SERPL-SCNC: >45 MMOL/L (ref 21–32)
CREAT SERPL-MCNC: 1.01 MG/DL (ref 0.7–1.3)
CREAT SERPL-MCNC: 1.09 MG/DL (ref 0.7–1.3)
DIFFERENTIAL METHOD BLD: ABNORMAL
ECHO AO ROOT DIAM: 3.9 CM
ECHO AO ROOT INDEX: 1.64 CM/M2
ECHO AV AREA PEAK VELOCITY: 3.1 CM2
ECHO AV AREA/BSA PEAK VELOCITY: 1.3 CM2/M2
ECHO AV PEAK GRADIENT: 7 MMHG
ECHO AV PEAK VELOCITY: 1.4 M/S
ECHO AV VELOCITY RATIO: 0.71
ECHO BSA: 2.38 M2
ECHO EST RA PRESSURE: 3 MMHG
ECHO LA DIAMETER INDEX: 1.64 CM/M2
ECHO LA DIAMETER: 3.9 CM
ECHO LA TO AORTIC ROOT RATIO: 1
ECHO LA VOL A-L A2C: 85 ML (ref 18–58)
ECHO LA VOL A-L A4C: 61 ML (ref 18–58)
ECHO LA VOL MOD A2C: 82 ML (ref 18–58)
ECHO LA VOL MOD A4C: 55 ML (ref 18–58)
ECHO LA VOLUME AREA LENGTH: 76 ML
ECHO LA VOLUME INDEX A-L A2C: 36 ML/M2 (ref 16–34)
ECHO LA VOLUME INDEX A-L A4C: 26 ML/M2 (ref 16–34)
ECHO LA VOLUME INDEX AREA LENGTH: 32 ML/M2 (ref 16–34)
ECHO LA VOLUME INDEX MOD A2C: 34 ML/M2 (ref 16–34)
ECHO LA VOLUME INDEX MOD A4C: 23 ML/M2 (ref 16–34)
ECHO LV FRACTIONAL SHORTENING: 38 % (ref 28–44)
ECHO LV INTERNAL DIMENSION DIASTOLE INDEX: 2.18 CM/M2
ECHO LV INTERNAL DIMENSION DIASTOLIC: 5.2 CM (ref 4.2–5.9)
ECHO LV INTERNAL DIMENSION SYSTOLIC INDEX: 1.34 CM/M2
ECHO LV INTERNAL DIMENSION SYSTOLIC: 3.2 CM
ECHO LV IVSD: 1.2 CM (ref 0.6–1)
ECHO LV MASS 2D: 220.8 G (ref 88–224)
ECHO LV MASS INDEX 2D: 92.8 G/M2 (ref 49–115)
ECHO LV POSTERIOR WALL DIASTOLIC: 1 CM (ref 0.6–1)
ECHO LV RELATIVE WALL THICKNESS RATIO: 0.38
ECHO LVOT AREA: 4.5 CM2
ECHO LVOT DIAM: 2.4 CM
ECHO LVOT PEAK GRADIENT: 4 MMHG
ECHO LVOT PEAK VELOCITY: 1 M/S
ECHO MV E VELOCITY: 1.05 M/S
ECHO PV MAX VELOCITY: 0.6 M/S
ECHO PV PEAK GRADIENT: 1 MMHG
ECHO RIGHT VENTRICULAR SYSTOLIC PRESSURE (RVSP): 27 MMHG
ECHO RV FREE WALL PEAK S': 11 CM/S
ECHO RV INTERNAL DIMENSION: 6.1 CM
ECHO RV TAPSE: 1.6 CM (ref 1.7–?)
ECHO TV REGURGITANT MAX VELOCITY: 2.47 M/S
ECHO TV REGURGITANT PEAK GRADIENT: 24 MMHG
EOSINOPHIL # BLD: 0 K/UL (ref 0–0.4)
EOSINOPHIL NFR BLD: 0 % (ref 0–7)
ERYTHROCYTE [DISTWIDTH] IN BLOOD BY AUTOMATED COUNT: 14.7 % (ref 11.5–14.5)
GLUCOSE BLD STRIP.AUTO-MCNC: 117 MG/DL (ref 65–117)
GLUCOSE BLD STRIP.AUTO-MCNC: 132 MG/DL (ref 65–117)
GLUCOSE BLD STRIP.AUTO-MCNC: 176 MG/DL (ref 65–117)
GLUCOSE BLD STRIP.AUTO-MCNC: 187 MG/DL (ref 65–117)
GLUCOSE SERPL-MCNC: 124 MG/DL (ref 65–100)
GLUCOSE SERPL-MCNC: 160 MG/DL (ref 65–100)
HCT VFR BLD AUTO: 36.4 % (ref 36.6–50.3)
HGB BLD-MCNC: 11.3 G/DL (ref 12.1–17)
IMM GRANULOCYTES # BLD AUTO: 0.2 K/UL (ref 0–0.04)
IMM GRANULOCYTES NFR BLD AUTO: 1 % (ref 0–0.5)
LYMPHOCYTES # BLD: 0.7 K/UL (ref 0.8–3.5)
LYMPHOCYTES NFR BLD: 4 % (ref 12–49)
MAGNESIUM SERPL-MCNC: 2.3 MG/DL (ref 1.6–2.4)
MCH RBC QN AUTO: 32 PG (ref 26–34)
MCHC RBC AUTO-ENTMCNC: 31 G/DL (ref 30–36.5)
MCV RBC AUTO: 103.1 FL (ref 80–99)
MONOCYTES # BLD: 0.7 K/UL (ref 0–1)
MONOCYTES NFR BLD: 4 % (ref 5–13)
NEUTS SEG # BLD: 14.7 K/UL (ref 1.8–8)
NEUTS SEG NFR BLD: 91 % (ref 32–75)
NRBC # BLD: 0 K/UL (ref 0–0.01)
NRBC BLD-RTO: 0 PER 100 WBC
PHOSPHATE SERPL-MCNC: 3.5 MG/DL (ref 2.6–4.7)
PLATELET # BLD AUTO: 338 K/UL (ref 150–400)
PLATELET COMMENT: ABNORMAL
PMV BLD AUTO: 9.7 FL (ref 8.9–12.9)
POTASSIUM SERPL-SCNC: 3.1 MMOL/L (ref 3.5–5.1)
POTASSIUM SERPL-SCNC: 4.4 MMOL/L (ref 3.5–5.1)
RBC # BLD AUTO: 3.53 M/UL (ref 4.1–5.7)
RBC MORPH BLD: ABNORMAL
SERVICE CMNT-IMP: ABNORMAL
SERVICE CMNT-IMP: NORMAL
SODIUM SERPL-SCNC: 132 MMOL/L (ref 136–145)
SODIUM SERPL-SCNC: 133 MMOL/L (ref 136–145)
WBC # BLD AUTO: 16.3 K/UL (ref 4.1–11.1)

## 2023-11-14 PROCEDURE — 36415 COLL VENOUS BLD VENIPUNCTURE: CPT

## 2023-11-14 PROCEDURE — 6370000000 HC RX 637 (ALT 250 FOR IP): Performed by: STUDENT IN AN ORGANIZED HEALTH CARE EDUCATION/TRAINING PROGRAM

## 2023-11-14 PROCEDURE — 6370000000 HC RX 637 (ALT 250 FOR IP): Performed by: NURSE PRACTITIONER

## 2023-11-14 PROCEDURE — 94760 N-INVAS EAR/PLS OXIMETRY 1: CPT

## 2023-11-14 PROCEDURE — 84100 ASSAY OF PHOSPHORUS: CPT

## 2023-11-14 PROCEDURE — 71045 X-RAY EXAM CHEST 1 VIEW: CPT

## 2023-11-14 PROCEDURE — 6360000002 HC RX W HCPCS: Performed by: NURSE PRACTITIONER

## 2023-11-14 PROCEDURE — C8929 TTE W OR WO FOL WCON,DOPPLER: HCPCS

## 2023-11-14 PROCEDURE — 85025 COMPLETE CBC W/AUTO DIFF WBC: CPT

## 2023-11-14 PROCEDURE — 99223 1ST HOSP IP/OBS HIGH 75: CPT | Performed by: NURSE PRACTITIONER

## 2023-11-14 PROCEDURE — 2580000003 HC RX 258: Performed by: NURSE PRACTITIONER

## 2023-11-14 PROCEDURE — 6370000000 HC RX 637 (ALT 250 FOR IP): Performed by: INTERNAL MEDICINE

## 2023-11-14 PROCEDURE — 83735 ASSAY OF MAGNESIUM: CPT

## 2023-11-14 PROCEDURE — 6360000002 HC RX W HCPCS: Performed by: INTERNAL MEDICINE

## 2023-11-14 PROCEDURE — 94618 PULMONARY STRESS TESTING: CPT

## 2023-11-14 PROCEDURE — 82962 GLUCOSE BLOOD TEST: CPT

## 2023-11-14 PROCEDURE — 80048 BASIC METABOLIC PNL TOTAL CA: CPT

## 2023-11-14 PROCEDURE — 2700000000 HC OXYGEN THERAPY PER DAY

## 2023-11-14 PROCEDURE — 2060000000 HC ICU INTERMEDIATE R&B

## 2023-11-14 PROCEDURE — 94640 AIRWAY INHALATION TREATMENT: CPT

## 2023-11-14 PROCEDURE — 6360000004 HC RX CONTRAST MEDICATION: Performed by: STUDENT IN AN ORGANIZED HEALTH CARE EDUCATION/TRAINING PROGRAM

## 2023-11-14 RX ORDER — POTASSIUM CHLORIDE 750 MG/1
40 TABLET, FILM COATED, EXTENDED RELEASE ORAL ONCE
Status: COMPLETED | OUTPATIENT
Start: 2023-11-14 | End: 2023-11-14

## 2023-11-14 RX ORDER — AZITHROMYCIN 250 MG/1
500 TABLET, FILM COATED ORAL DAILY
Status: DISCONTINUED | OUTPATIENT
Start: 2023-11-15 | End: 2023-11-16

## 2023-11-14 RX ORDER — DIPHENHYDRAMINE HYDROCHLORIDE 50 MG/ML
25 INJECTION INTRAMUSCULAR; INTRAVENOUS NIGHTLY PRN
Status: DISCONTINUED | OUTPATIENT
Start: 2023-11-14 | End: 2023-11-16 | Stop reason: HOSPADM

## 2023-11-14 RX ORDER — GUAIFENESIN/DEXTROMETHORPHAN 100-10MG/5
5 SYRUP ORAL EVERY 4 HOURS PRN
Status: DISCONTINUED | OUTPATIENT
Start: 2023-11-14 | End: 2023-11-16 | Stop reason: HOSPADM

## 2023-11-14 RX ORDER — AZITHROMYCIN 250 MG/1
500 TABLET, FILM COATED ORAL DAILY
Status: DISCONTINUED | OUTPATIENT
Start: 2023-11-14 | End: 2023-11-14

## 2023-11-14 RX ORDER — DIGOXIN 250 MCG
125 TABLET ORAL DAILY
Status: DISCONTINUED | OUTPATIENT
Start: 2023-11-15 | End: 2023-11-16 | Stop reason: HOSPADM

## 2023-11-14 RX ORDER — FUROSEMIDE 40 MG/1
40 TABLET ORAL 2 TIMES DAILY
Status: DISCONTINUED | OUTPATIENT
Start: 2023-11-14 | End: 2023-11-16 | Stop reason: HOSPADM

## 2023-11-14 RX ORDER — MIDODRINE HYDROCHLORIDE 5 MG/1
5 TABLET ORAL
Status: DISCONTINUED | OUTPATIENT
Start: 2023-11-14 | End: 2023-11-16 | Stop reason: HOSPADM

## 2023-11-14 RX ORDER — ALPRAZOLAM 0.5 MG/1
0.5 TABLET ORAL ONCE
Status: COMPLETED | OUTPATIENT
Start: 2023-11-14 | End: 2023-11-14

## 2023-11-14 RX ORDER — DIGOXIN 0.25 MG/ML
125 INJECTION INTRAMUSCULAR; INTRAVENOUS ONCE
Status: COMPLETED | OUTPATIENT
Start: 2023-11-14 | End: 2023-11-14

## 2023-11-14 RX ORDER — AMIODARONE HYDROCHLORIDE 200 MG/1
400 TABLET ORAL 2 TIMES DAILY
Status: DISCONTINUED | OUTPATIENT
Start: 2023-11-14 | End: 2023-11-15

## 2023-11-14 RX ORDER — METOPROLOL TARTRATE 50 MG/1
100 TABLET, FILM COATED ORAL 2 TIMES DAILY
Status: DISCONTINUED | OUTPATIENT
Start: 2023-11-14 | End: 2023-11-16 | Stop reason: HOSPADM

## 2023-11-14 RX ORDER — GUAIFENESIN 600 MG/1
600 TABLET, EXTENDED RELEASE ORAL 2 TIMES DAILY
Status: DISCONTINUED | OUTPATIENT
Start: 2023-11-14 | End: 2023-11-16 | Stop reason: HOSPADM

## 2023-11-14 RX ORDER — LORAZEPAM 2 MG/ML
1 INJECTION INTRAMUSCULAR EVERY 4 HOURS PRN
Status: DISCONTINUED | OUTPATIENT
Start: 2023-11-14 | End: 2023-11-16 | Stop reason: HOSPADM

## 2023-11-14 RX ADMIN — ALPRAZOLAM 0.5 MG: 0.5 TABLET ORAL at 00:41

## 2023-11-14 RX ADMIN — GUAIFENESIN 600 MG: 600 TABLET, EXTENDED RELEASE ORAL at 20:13

## 2023-11-14 RX ADMIN — PERFLUTREN 1.5 ML: 6.52 INJECTION, SUSPENSION INTRAVENOUS at 10:13

## 2023-11-14 RX ADMIN — ONDANSETRON 4 MG: 2 INJECTION INTRAMUSCULAR; INTRAVENOUS at 15:33

## 2023-11-14 RX ADMIN — SODIUM CHLORIDE, PRESERVATIVE FREE 10 ML: 5 INJECTION INTRAVENOUS at 08:24

## 2023-11-14 RX ADMIN — IPRATROPIUM BROMIDE 0.5 MG: 0.5 SOLUTION RESPIRATORY (INHALATION) at 08:03

## 2023-11-14 RX ADMIN — IPRATROPIUM BROMIDE 0.5 MG: 0.5 SOLUTION RESPIRATORY (INHALATION) at 15:23

## 2023-11-14 RX ADMIN — Medication 6 MG: at 20:13

## 2023-11-14 RX ADMIN — CASTOR OIL AND BALSAM, PERU: 788; 87 OINTMENT TOPICAL at 08:23

## 2023-11-14 RX ADMIN — PREDNISONE 40 MG: 20 TABLET ORAL at 12:04

## 2023-11-14 RX ADMIN — AMIODARONE HYDROCHLORIDE 400 MG: 200 TABLET ORAL at 20:14

## 2023-11-14 RX ADMIN — SODIUM CHLORIDE, PRESERVATIVE FREE 10 ML: 5 INJECTION INTRAVENOUS at 20:14

## 2023-11-14 RX ADMIN — MIDODRINE HYDROCHLORIDE 5 MG: 5 TABLET ORAL at 13:43

## 2023-11-14 RX ADMIN — METOPROLOL TARTRATE 100 MG: 50 TABLET, FILM COATED ORAL at 13:43

## 2023-11-14 RX ADMIN — CASTOR OIL AND BALSAM, PERU: 788; 87 OINTMENT TOPICAL at 20:14

## 2023-11-14 RX ADMIN — GUAIFENESIN 600 MG: 600 TABLET, EXTENDED RELEASE ORAL at 11:54

## 2023-11-14 RX ADMIN — SERTRALINE HYDROCHLORIDE 25 MG: 50 TABLET ORAL at 08:30

## 2023-11-14 RX ADMIN — AMIODARONE HYDROCHLORIDE 400 MG: 200 TABLET ORAL at 13:59

## 2023-11-14 RX ADMIN — AZITHROMYCIN DIHYDRATE 500 MG: 250 TABLET, FILM COATED ORAL at 08:30

## 2023-11-14 RX ADMIN — ASPIRIN 81 MG CHEWABLE TABLET 81 MG: 81 TABLET CHEWABLE at 08:30

## 2023-11-14 RX ADMIN — FUROSEMIDE 40 MG: 40 TABLET ORAL at 17:07

## 2023-11-14 RX ADMIN — POTASSIUM CHLORIDE 40 MEQ: 750 TABLET, FILM COATED, EXTENDED RELEASE ORAL at 05:53

## 2023-11-14 RX ADMIN — APIXABAN 5 MG: 5 TABLET, FILM COATED ORAL at 08:30

## 2023-11-14 RX ADMIN — METHYLPREDNISOLONE SODIUM SUCCINATE 40 MG: 40 INJECTION, POWDER, FOR SOLUTION INTRAMUSCULAR; INTRAVENOUS at 05:53

## 2023-11-14 RX ADMIN — ARFORMOTEROL TARTRATE: 15 SOLUTION RESPIRATORY (INHALATION) at 08:03

## 2023-11-14 RX ADMIN — DIGOXIN 125 MCG: 0.25 INJECTION INTRAMUSCULAR; INTRAVENOUS at 11:54

## 2023-11-14 RX ADMIN — FUROSEMIDE 40 MG: 40 TABLET ORAL at 10:11

## 2023-11-14 RX ADMIN — SENNOSIDES 8.6 MG: 8.6 TABLET, FILM COATED ORAL at 20:13

## 2023-11-14 RX ADMIN — APIXABAN 5 MG: 5 TABLET, FILM COATED ORAL at 20:13

## 2023-11-14 RX ADMIN — WATER 1000 MG: 1 INJECTION INTRAMUSCULAR; INTRAVENOUS; SUBCUTANEOUS at 15:33

## 2023-11-14 RX ADMIN — METHYLPREDNISOLONE SODIUM SUCCINATE 40 MG: 40 INJECTION, POWDER, FOR SOLUTION INTRAMUSCULAR; INTRAVENOUS at 00:41

## 2023-11-14 RX ADMIN — TRAZODONE HYDROCHLORIDE 50 MG: 50 TABLET ORAL at 20:13

## 2023-11-14 RX ADMIN — POTASSIUM CHLORIDE 40 MEQ: 750 TABLET, FILM COATED, EXTENDED RELEASE ORAL at 10:11

## 2023-11-14 RX ADMIN — METOPROLOL TARTRATE 75 MG: 50 TABLET ORAL at 02:30

## 2023-11-14 ASSESSMENT — ENCOUNTER SYMPTOMS
SHORTNESS OF BREATH: 1
VOMITING: 0
ABDOMINAL PAIN: 0
NAUSEA: 0
TROUBLE SWALLOWING: 0
RHINORRHEA: 0
BACK PAIN: 0

## 2023-11-14 ASSESSMENT — PAIN SCALES - GENERAL: PAINLEVEL_OUTOF10: 0

## 2023-11-14 NOTE — PROGRESS NOTES
Physical Therapy 11/14/2023         Chart reviewed. Patient heart rate at 135 bpm at rest- will defer mobility until at lower rate. Will continue to follow. Attempted in pm- heart rate 147 bpm at rest. Nursing requested to defer.      Debra Mayo, PT

## 2023-11-14 NOTE — CONSULTS
S Cardiology Consult Note    Date of consult:  11/14/23  Date of admission: 11/12/2023  Primary Cardiologist: Dr Carol Jaffe  Physician Requesting consult: Dr Lala Favor / Reason for consult:   Chief Complaint   Patient presents with    Shortness of Breath        History of the presenting illness:  Annie Thomas is a 77 y.o. M with known COPD, who was admitted with worsening respiratory failure. He says his main symptom has been worsening dyspnea. He has home O2 for his COPD but this was not helping. He has been noted to be in a.fib since arrival and HR has been difficult to control. He does not mention chest pain or palpitations. He has been given IV digoxin, but his rate remains difficult to control. BP is low, mostly around 102 systolic, limiting options for further rate control agents. [facility] records reviewed:   His primary Cardiologist is Dr Aurelio Garza but he hasn't seen him since 2021. More recently he was admitted in July this year to [facility] and seen there by Dr Ramesh aHas. He had a very similar reason for admission at that time. Was in a.fib. Cardioversion was planned but he converted to NSR prior to this being done. He has been on Eliquis for PAF and h/o PE. He has a known h/o cardiomyopathy with a normal PET stress test, so presumed to be non-ischemic. He did not follow-up after discharge from [facility], but when Dr Ramesh Haas was seeing him in hospital, he was supposed to be on Entresto, metoprolol, spironolactone and bumex 2mg bid. Prior to Admission medications    Medication Sig Start Date End Date Taking?  Authorizing Provider   fluticasone-umeclidin-vilant (TRELEGY ELLIPTA) 200-62.5-25 MCG/ACT AEPB inhaler Inhale 1 puff into the lungs daily   Yes Provider, MD Lynn   acetaminophen (TYLENOL) 500 MG tablet Take 2 tablets by mouth every 8 hours as needed for Pain   Yes Provider, MD Lynn   albuterol (PROVENTIL) (2.5 MG/3ML) 0.083% nebulizer solution Take 3 mLs by K 3.1*   CL 79*   CO2 >45*   BUN 35*   CREATININE 1.01   GLUCOSE 160*   CALCIUM 8.7        CBC:  Lab Results   Component Value Date/Time    WBC 16.3 11/14/2023 04:23 AM    HGB 11.3 11/14/2023 04:23 AM    HCT 36.4 11/14/2023 04:23 AM     11/14/2023 04:23 AM    .1 11/14/2023 04:23 AM     proBNP 1,876    Data review:  EKG tracing personally reviewed:   11/12/23: a.fib, rate 151 bpm, RBBB    Echocardiogram:  11/12/23    ECHO (TTE) COMPLETE (PRN CONTRAST/BUBBLE/STRAIN/3D) 11/14/2023 10:56 AM (Final)    Interpretation Summary    Left Ventricle: Mildly reduced left ventricular systolic function with a visually estimated EF of 45 - 50%. Left ventricle size is normal. Mildly increased wall thickness. Mild global hypokinesis present. Right Ventricle: Mildly reduced systolic function. Aortic Valve: Trileaflet valve. Mitral Valve: Mild regurgitation. Left Atrium: Left atrium is dilated. Right Atrium: Right atrium is dilated. Contrast used: Definity. Signed by: Alley Lamar MD on 11/14/2023 10:56 AM      CXR  11/12/23  FINDINGS:  AP portable upright view of the chest demonstrates a enlarged cardiopericardial  silhouette. Layering right-sided effusion with right basilar atelectasis. There is  no focal consolidation. .There is no pneumothorax. . Patient is on a cardiac  monitor. IMPRESSION:  Cardiomegaly with right basilar atelectasis and effusion.        Assessment & Recommendations / Plan:    Acute respiratory failure  Acute exacerbation of severe COPD  Treatment as per primary team with nebs, steroids etc  Other persistent atrial fibrillation   Uncontrolled rate  Low BP limiting options  Continue Eliquis  Adding PO amiodarone 400mg bid  Can also give PO digoxin at low dose, but need to get potassium up to reduce risk of arrhythmias associated with digoxin and hypokalemia  Will make NPO from midnight and schedule for DCCV tomorrow with Anesthesia  Chronic systolic heart failure  LVEF

## 2023-11-14 NOTE — PROGRESS NOTES
0947- Patient's -130s. Dr. Arlington Oppenheim informed. Cardiology consult placed. 5- Spoke with Dr. Izabela Silva regarding patient's -160s A. Fib. Already gave a dose of Digoxin, BP soft SBP 80-90s. No new orders received. 56- Informed Dr. Arlington Oppenheim of patient's vitals. Order received to start midodrine and to give PO metoprolol early. 1500- Patient's HR improved. HR 90-110s, SBP 90-100s.

## 2023-11-14 NOTE — CARE COORDINATION
Transition of Care Plan:    RUR: 17%   Prior Level of Functioning: Needs assistance with ADL's  Disposition: Home with Home health, ? [facility]  Follow up appointments: PCP  DME needed: Hospital bed , Oxygen  Transportation at discharge: S  /[facility] Medicare No  Is patient a [place] and connected with [place]? No  Caregiver Contact: Rosemary Andujar [phone]  Discharge Caregiver contacted prior to discharge? Yes  Care Conference needed? No  Barriers to discharge:    Bipap weaned to Mid Flow  Afib, rate now controlled. Plan will be for daughters to take patient home. They have requested a hospital bed. CM ordered a hospital bed through [address] [place] [address]. CM notified rosemary Andujar that bed has been ordered for a delivery date of Thursday.    López Alanis RN,Care Manager

## 2023-11-14 NOTE — PROGRESS NOTES
Physician Progress Note      PATIENT:               Bobbi Rater  CSN #:                  [id number]  :                       1955  ADMIT DATE:       2023 10:13 AM  DISCH DATE:  RESPONDING  PROVIDER #:        Logan Miller MD          QUERY TEXT:    Pt admitted with Resp Failure and has CHF documented. If possible, please   document in progress notes and discharge summary further specificity regarding   the type and acuity of CHF:      The medical record reflects the following:  Risk Factors:  Systolic CHF    Clinical Indicators:  pn   HFrEF (LVEF 40-45%), not in exacerbation      Treatment:  furosemide (LASIX) tablet 40 mg    Thank you,  Yadi Maloney RN [facility]  Clinical Documentation  [phone] or via [address] provided:  -- Chronic Systolic CHF/HFrEF  -- Acute on Chronic Systolic CHF/HFrEF  -- Other - I will add my own diagnosis  -- Disagree - Not applicable / Not valid  -- Disagree - Clinically unable to determine / Unknown  -- Refer to Clinical Documentation Reviewer    PROVIDER RESPONSE TEXT:    This patient is in acute on chronic systolic CHF/HFrEF. Query created by: Yadi Maloney on 2023 1:22 PM      QUERY TEXT:    Patient admitted with COPD, noted to have atrial fibrillation and is   maintained on Eliquis. If possible, please document in progress notes and   discharge summary if you are evaluating and/or treating any of the following:     The medical record reflects the following:  Risk Factors: a fib    Clinical Indicators:  H&P  IV metoprolol for rapid A-fib  We will resume home metoprolol  Continue home Eliquis    Treatment:  Eliquis    Thank you,  Yadi Maloney RN [facility]  Clinical Documentation  [phone] or via [employer]  Options provided:  -- Secondary hypercoagulable state in a patient with atrial fibrillation  -- Other - I will add my own diagnosis  -- Disagree - Not applicable / Not valid  -- Disagree - Clinically unable to determine / Unknown  --

## 2023-11-14 NOTE — PROGRESS NOTES
Spiritual Care Assessment/Progress Note  [facility]    Name: Karime Quijano MRN: [id number]    Age: 77 y.o. Sex: male   Language: English     Date: 11/14/2023            Total Time Calculated: 15 min              Spiritual Assessment begun in [address] [place] [address] Provided For[de-identified] Patient not available  Referral/Consult From[de-identified] Palliative Care  Encounter Overview/Reason : Palliative Care    Spiritual beliefs:      [] Involved in a adina tradition/spiritual practice:      [] Supported by a adina community:      [] Claims no spiritual orientation:      [] Seeking spiritual identity:           [] Adheres to an individual form of spirituality:      [x] Not able to assess:                Identified resources for coping and support system:   Support System: Unknown       [] Prayer                  [] Devotional reading               [] Music                  [] Guided Imagery     [] Pet visits                                        [] Other: (COMMENT)     Specific area/focus of visit   Encounter:    Crisis:    Spiritual/Emotional needs:    Ritual, Rites and Sacraments:    Grief, Loss, and Adjustments:    Ethics/Mediation:    Behavioral Health:    Palliative Care: Type: Palliative Care, Initial/Spiritual Assessment  Advance Care Planning:         Attempted visit for palliative initial spiritual assessment. Unable to visit patient at this time. Patient was sleeping and did not awake. No family present. Patient's chart was consulted.   Chaplain Carmela, MDiv, MS, [facility]

## 2023-11-14 NOTE — PROGRESS NOTES
I participated in the IDT meeting where the plan of care for Reather Erika was discussed on [address]. I reviewed the patient's medical record prior to this meeting. We will continue to follow and visit for spiritual assessment when appropriate. Please contact  paging service for any immediate needs. _____________________________  Samy Murray M.Div., M.S., B.C.C.   Staff

## 2023-11-14 NOTE — PROGRESS NOTES
Occupational Therapy  11/14/2023    Chart reviewed in prep for OT session. Pt with HR 135bpm at rest. Will defer this AM and follow up as appropriate. Second attempt at 1330: upon arrival to room, pt with  at rest in bed. Spoke to RN, who stated pt not appropriate for OT evaluation at this time. Will follow up tomorrow.      Thank you,  Daryn Nino, DEWAYNE, OTR/L

## 2023-11-14 NOTE — PROGRESS NOTES
CRITICAL CARE ADMISSION NOTE      Name: Tessy Jay   : 1955   MRN: [id number]   Date: 2023      REASON FOR ICU ADMISSION:  Acute on chronic hypoxic/hypercapnic respiratory failure     PRINCIPAL ICU DIAGNOSIS   Acute on chronic hypoxic/hypercapnic respiratory failure     BRIEF PATIENT SUMMARY   Reza Morrow is a 49-year-old male with PMHx of COPD on 4L NC oxygen at baseline, HF (LVEF 40-45%), A-fib, who presented to ED on  with complaints of increasing shortness of breath and palpitations over the last several days. Of note, he was admitted to [facility] 10/13 - 10/25 for COPD exacerbation where he required intubation. Upon arrival to ED, he was found to be in rapid A-fib. Diltiazem drip started. Patient is on metoprolol at rehab facility. ABG in ED 7.. He was placed on BiPAP to assist in his increased work of breathing and shortness of breath. CXR showing cardiomegaly with bilateral pleural effusions. ICU was consulted for admission. He was admitted to the ICU for acute on chronic hypoxic, hypercapnic respiratory failure and afib with RVR. He was on BiPAP 40%  overnight and has been weaned to mid-flow this am.  His afib is now rate controlled. COMPREHENSIVE ASSESSMENT & PLAN:SYSTEM BASED     24 HOUR EVENTS: As above. Insomnia last night with BiPAP mask on.     NEUROLOGICAL:  Depression  Insomnia   Close neurologic monitoring  Home sertraline  Home melatonin, trazodone    PULMONOLOGY:  Acute on chronic hypoxic/hypercapnic respiratory failure   COPD Exacerbation   BiPAP on  40% at night  4L NC at baseline, nocturnal CPAP at home; plan for nocturnal BiPAP, above settings here  Wean Fio2 as tolerated here for goal sats 88-92%  Home Trelegy Ellipta, scheduled albuterol  Steroid taper  CXR: Cardiomegaly with bilateral pleural effusions  Holding diuresis given alkalosis and euvolemic appearance   Ceftriaxone and azithromycin  Aspiration precautions  Repeat CXR this am    CARDIOVASCULAR:  HFrEF (LVEF 40-45%), not in exacerbation  AFIB (on eliquis)   Tele  MAP goal > 65  TTE pending  Metoprolol 75mg q12  Continue home Eliquis  Aspirin    GASTROINTESTINAL   NPO  Home miralax, senna    RENAL/ELECTROLYTE/FLUIDS:  Contraction alkalosis  Metabolic alkalosis  Hypokalemia  Hyponatremia  Hypomagnesemia   Daily BMP  Replete lytes prn  Monitor UOP  Once K is repleted, can diurese with acetazolamide    ENDOCRINE:   Avoid hypo/hyperglycemia    HEMATOLOGY/ONCOLOGY:   Daily CBC  Transfuse prn    INFECTIOUS DISEASE:     ANTIBIOTICS TO DATE:  Ceftriaxone; azithromycin    CULTURES TO DATE:  Results       Procedure Component Value Units Date/Time    Culture, Blood 1 [[id number]] Collected: 11/12/23 1041    Order Status: Completed Specimen: Blood Updated: 11/13/23 1122     Special Requests --        NO SPECIAL REQUESTS  LEFT  FOREARM       Culture NO GROWTH 1 DAY       Culture, Blood 2 [[id number]] Collected: 11/12/23 1041    Order Status: Completed Specimen: Blood Updated: 11/13/23 1122     Special Requests --        NO SPECIAL REQUESTS  RIGHT  FOREARM       Culture NO GROWTH 1 DAY                ICU DAILY CHECKLIST     Code Status: Full Code    DVT Prophylaxis: Eliquis  T/L/D: Tubes: None  Lines: Peripheral IV  Drains: None  SUP: N/A  Diet: ADULT DIET; Regular   Activity Level: ad mary w/ assist  ABCDEF Bundle/Checklist Completed: Yes  Disposition: Transfer to non-ICU bed  Multidisciplinary Rounds Completed:  N/A  Goals of Care Discussion/Palliative: Yes  Patient/Family Updated: Yes    [address]     As above    OBJECTIVE   Physical Exam  Vitals and nursing note reviewed. Constitutional:       General: He is not in acute distress. Appearance: He is obese. He is ill-appearing (chronically). HENT:      Head: Normocephalic. Mouth/Throat:      Mouth: Mucous membranes are dry. Pharynx: Oropharynx is clear.    Eyes:      Conjunctiva/sclera: Conjunctivae normal.

## 2023-11-14 NOTE — H&P
PO2 76 on 40 L per BMP bicarb greater than 45 suspect metabolic compensation for hypercapnia. Patient weaned off from BiPAP. Per [facility] record Thaddeus Kendall was 43 on 10/25/23    -BiPAP as needed currently on high flow  -Ipratropium scheduled and as needed, avoiding Albuterol due to beta adrenergic effect   -Pulmicort and Brovana  -Systemic steroids  - Cont CTX  -Mucinex Robitussin as needed  -6-minute walk test per COPD committee of [address] consult for COPD committee of [facility]    Atrial fibrillation  Heart failure with reduced ejection fraction  Biventricular failure  RV failure   - EKG w/ Atrial fib w/ RVR. TTE 10/2023 at [facility]  EF 40- 45% , RV shows volume overload. CXR w/ right pleural effusion     - Increase metoprolol 100 mg BID, avoid dilt d/t negative inotropic effect   -Cont Eliquis BID   - keep mag > 2 and K > 4   - Follow up in TTE   - Restart lasix for diruesis   - may need RHC, cardiology consulted   -Ins and outs Daily weight fluid restriction low-sodium diet        DIET: ADULT DIET; Regular; 4 carb choices (60 gm/meal); Low Sodium (2 gm); 1800 ml   ISOLATION PRECAUTIONS: No active isolations  CODE STATUS: Full Code   DVT PROPHYLAXIS: Eliquis  FUNCTIONAL STATUS PRIOR TO HOSPITALIZATION: Fully active and ambulatory; able to carry on all self-care without restriction. Ambulatory status/function: By self   EARLY MOBILITY ASSESSMENT: Recommend an assessment from physical therapy and/or occupational therapy  ANTICIPATED DISCHARGE: Greater than 48 hours. ANTICIPATED DISPOSITION: Home with Home Healthcare  EMERGENCY CONTACT/SURROGATE DECISION MAKER: Pending     CRITICAL CARE WAS PERFORMED FOR THIS ENCOUNTER: NO.      Signed By: Mauricio Villafuerte MD     November 14, 2023         Please note that this dictation may have been completed with Dragon, the [employer] voice recognition software.   Quite often unanticipated grammatical, syntax, homophones, and other interpretive errors are inadvertently transcribed by the computer software. Please disregard these errors. Please excuse any errors that have escaped final proofreading.

## 2023-11-14 NOTE — PROGRESS NOTES
4 Eyes Skin Assessment     NAME:  Mundo Strauss  YOB: 1955  MEDICAL RECORD NUMBER:  [id number]    The patient is being assessed for  Shift Handoff    I agree that at least one RN has performed a thorough Head to Toe Skin Assessment on the patient. ALL assessment sites listed below have been assessed. Areas assessed by both nurses:    Head, Face, Ears, Shoulders, Back, Chest, Arms, Elbows, Hands, Sacrum. Buttock, Coccyx, Ischium, and Legs. Feet and Heels        Does the Patient have a Wound? Yes wound(s) were present on assessment.  LDA wound assessment was Initiated and completed by RN       Uri Prevention initiated by RN: Yes    Pressure Injury (Stage 3,4, Unstageable, DTI, NWPT, and Complex wounds) if present, place Wound referral order by RN under : Yes    Nurse 1 eSignature: Electronically signed by Alpesh Huang RN on 11/14/23 at 10:24 AM EST    **SHARE this note so that the co-signing nurse can place an eSignature**    Nurse 2 eSignature: Electronically signed by Robert Ambrosio RN on 11/14/23 at 10:28 AM EST

## 2023-11-15 ENCOUNTER — APPOINTMENT (OUTPATIENT)
Facility: HOSPITAL | Age: 68
DRG: 308 | End: 2023-11-15
Payer: MEDICARE

## 2023-11-15 LAB
ALBUMIN SERPL-MCNC: 2.6 G/DL (ref 3.5–5)
ALBUMIN/GLOB SERPL: 0.6 (ref 1.1–2.2)
ALP SERPL-CCNC: 84 U/L (ref 45–117)
ALT SERPL-CCNC: 107 U/L (ref 12–78)
ANION GAP SERPL CALC-SCNC: 2 MMOL/L (ref 5–15)
AST SERPL-CCNC: 73 U/L (ref 15–37)
BASOPHILS # BLD: 0 K/UL (ref 0–0.1)
BASOPHILS NFR BLD: 0 % (ref 0–1)
BILIRUB SERPL-MCNC: 0.6 MG/DL (ref 0.2–1)
BUN SERPL-MCNC: 43 MG/DL (ref 6–20)
BUN/CREAT SERPL: 40 (ref 12–20)
CALCIUM SERPL-MCNC: 8.6 MG/DL (ref 8.5–10.1)
CHLORIDE SERPL-SCNC: 86 MMOL/L (ref 97–108)
CO2 SERPL-SCNC: 42 MMOL/L (ref 21–32)
CREAT SERPL-MCNC: 1.08 MG/DL (ref 0.7–1.3)
DIFFERENTIAL METHOD BLD: ABNORMAL
EOSINOPHIL # BLD: 0 K/UL (ref 0–0.4)
EOSINOPHIL NFR BLD: 0 % (ref 0–7)
ERYTHROCYTE [DISTWIDTH] IN BLOOD BY AUTOMATED COUNT: 14.6 % (ref 11.5–14.5)
GLOBULIN SER CALC-MCNC: 4.3 G/DL (ref 2–4)
GLUCOSE BLD STRIP.AUTO-MCNC: 112 MG/DL (ref 65–117)
GLUCOSE BLD STRIP.AUTO-MCNC: 140 MG/DL (ref 65–117)
GLUCOSE BLD STRIP.AUTO-MCNC: 227 MG/DL (ref 65–117)
GLUCOSE SERPL-MCNC: 92 MG/DL (ref 65–100)
HCT VFR BLD AUTO: 36.2 % (ref 36.6–50.3)
HGB BLD-MCNC: 11.3 G/DL (ref 12.1–17)
IMM GRANULOCYTES # BLD AUTO: 0.2 K/UL (ref 0–0.04)
IMM GRANULOCYTES NFR BLD AUTO: 1 % (ref 0–0.5)
LYMPHOCYTES # BLD: 1.1 K/UL (ref 0.8–3.5)
LYMPHOCYTES NFR BLD: 6 % (ref 12–49)
MAGNESIUM SERPL-MCNC: 2.2 MG/DL (ref 1.6–2.4)
MCH RBC QN AUTO: 31.8 PG (ref 26–34)
MCHC RBC AUTO-ENTMCNC: 31.2 G/DL (ref 30–36.5)
MCV RBC AUTO: 102 FL (ref 80–99)
MONOCYTES # BLD: 1.2 K/UL (ref 0–1)
MONOCYTES NFR BLD: 7 % (ref 5–13)
NEUTS SEG # BLD: 15.9 K/UL (ref 1.8–8)
NEUTS SEG NFR BLD: 86 % (ref 32–75)
NRBC # BLD: 0 K/UL (ref 0–0.01)
NRBC BLD-RTO: 0 PER 100 WBC
PHOSPHATE SERPL-MCNC: 2.8 MG/DL (ref 2.6–4.7)
PLATELET # BLD AUTO: 328 K/UL (ref 150–400)
PMV BLD AUTO: 9.6 FL (ref 8.9–12.9)
POTASSIUM SERPL-SCNC: 4.9 MMOL/L (ref 3.5–5.1)
PROT SERPL-MCNC: 6.9 G/DL (ref 6.4–8.2)
RBC # BLD AUTO: 3.55 M/UL (ref 4.1–5.7)
SERVICE CMNT-IMP: ABNORMAL
SERVICE CMNT-IMP: ABNORMAL
SERVICE CMNT-IMP: NORMAL
SODIUM SERPL-SCNC: 130 MMOL/L (ref 136–145)
WBC # BLD AUTO: 18.4 K/UL (ref 4.1–11.1)

## 2023-11-15 PROCEDURE — 6360000002 HC RX W HCPCS: Performed by: NURSE PRACTITIONER

## 2023-11-15 PROCEDURE — 97535 SELF CARE MNGMENT TRAINING: CPT

## 2023-11-15 PROCEDURE — 83735 ASSAY OF MAGNESIUM: CPT

## 2023-11-15 PROCEDURE — 82962 GLUCOSE BLOOD TEST: CPT

## 2023-11-15 PROCEDURE — 6370000000 HC RX 637 (ALT 250 FOR IP): Performed by: EMERGENCY MEDICINE

## 2023-11-15 PROCEDURE — 6370000000 HC RX 637 (ALT 250 FOR IP): Performed by: STUDENT IN AN ORGANIZED HEALTH CARE EDUCATION/TRAINING PROGRAM

## 2023-11-15 PROCEDURE — 99232 SBSQ HOSP IP/OBS MODERATE 35: CPT | Performed by: NURSE PRACTITIONER

## 2023-11-15 PROCEDURE — 2580000003 HC RX 258: Performed by: NURSE PRACTITIONER

## 2023-11-15 PROCEDURE — 85025 COMPLETE CBC W/AUTO DIFF WBC: CPT

## 2023-11-15 PROCEDURE — 6370000000 HC RX 637 (ALT 250 FOR IP): Performed by: NURSE PRACTITIONER

## 2023-11-15 PROCEDURE — 94660 CPAP INITIATION&MGMT: CPT

## 2023-11-15 PROCEDURE — 84100 ASSAY OF PHOSPHORUS: CPT

## 2023-11-15 PROCEDURE — 2700000000 HC OXYGEN THERAPY PER DAY

## 2023-11-15 PROCEDURE — 71045 X-RAY EXAM CHEST 1 VIEW: CPT

## 2023-11-15 PROCEDURE — 6360000002 HC RX W HCPCS: Performed by: STUDENT IN AN ORGANIZED HEALTH CARE EDUCATION/TRAINING PROGRAM

## 2023-11-15 PROCEDURE — 97530 THERAPEUTIC ACTIVITIES: CPT

## 2023-11-15 PROCEDURE — 6370000000 HC RX 637 (ALT 250 FOR IP): Performed by: INTERNAL MEDICINE

## 2023-11-15 PROCEDURE — 36415 COLL VENOUS BLD VENIPUNCTURE: CPT

## 2023-11-15 PROCEDURE — 1200000000 HC SEMI PRIVATE

## 2023-11-15 PROCEDURE — 97165 OT EVAL LOW COMPLEX 30 MIN: CPT

## 2023-11-15 PROCEDURE — 80053 COMPREHEN METABOLIC PANEL: CPT

## 2023-11-15 PROCEDURE — 2580000003 HC RX 258: Performed by: STUDENT IN AN ORGANIZED HEALTH CARE EDUCATION/TRAINING PROGRAM

## 2023-11-15 PROCEDURE — 94640 AIRWAY INHALATION TREATMENT: CPT

## 2023-11-15 RX ORDER — HYDROMORPHONE HYDROCHLORIDE 1 MG/ML
0.25 INJECTION, SOLUTION INTRAMUSCULAR; INTRAVENOUS; SUBCUTANEOUS
Status: DISCONTINUED | OUTPATIENT
Start: 2023-11-15 | End: 2023-11-16 | Stop reason: HOSPADM

## 2023-11-15 RX ORDER — ALBUTEROL SULFATE 2.5 MG/3ML
2.5 SOLUTION RESPIRATORY (INHALATION) EVERY 6 HOURS PRN
Status: DISCONTINUED | OUTPATIENT
Start: 2023-11-15 | End: 2023-11-16 | Stop reason: HOSPADM

## 2023-11-15 RX ORDER — AMIODARONE HYDROCHLORIDE 200 MG/1
200 TABLET ORAL DAILY
Status: DISCONTINUED | OUTPATIENT
Start: 2023-11-15 | End: 2023-11-16 | Stop reason: HOSPADM

## 2023-11-15 RX ORDER — ARFORMOTEROL TARTRATE 15 UG/2ML
15 SOLUTION RESPIRATORY (INHALATION)
Status: DISCONTINUED | OUTPATIENT
Start: 2023-11-16 | End: 2023-11-16 | Stop reason: HOSPADM

## 2023-11-15 RX ORDER — BUDESONIDE 0.5 MG/2ML
0.5 INHALANT ORAL
Status: DISCONTINUED | OUTPATIENT
Start: 2023-11-16 | End: 2023-11-16 | Stop reason: HOSPADM

## 2023-11-15 RX ADMIN — GUAIFENESIN 600 MG: 600 TABLET, EXTENDED RELEASE ORAL at 08:58

## 2023-11-15 RX ADMIN — CASTOR OIL AND BALSAM, PERU: 788; 87 OINTMENT TOPICAL at 12:54

## 2023-11-15 RX ADMIN — APIXABAN 5 MG: 5 TABLET, FILM COATED ORAL at 21:30

## 2023-11-15 RX ADMIN — MIDODRINE HYDROCHLORIDE 5 MG: 5 TABLET ORAL at 08:57

## 2023-11-15 RX ADMIN — PREDNISONE 40 MG: 20 TABLET ORAL at 09:10

## 2023-11-15 RX ADMIN — DIGOXIN 125 MCG: 250 TABLET ORAL at 08:58

## 2023-11-15 RX ADMIN — APIXABAN 5 MG: 5 TABLET, FILM COATED ORAL at 08:57

## 2023-11-15 RX ADMIN — SODIUM CHLORIDE, PRESERVATIVE FREE 10 ML: 5 INJECTION INTRAVENOUS at 09:00

## 2023-11-15 RX ADMIN — Medication 1 UNITS: at 12:54

## 2023-11-15 RX ADMIN — ARFORMOTEROL TARTRATE: 15 SOLUTION RESPIRATORY (INHALATION) at 07:46

## 2023-11-15 RX ADMIN — IPRATROPIUM BROMIDE 0.5 MG: 0.5 SOLUTION RESPIRATORY (INHALATION) at 07:46

## 2023-11-15 RX ADMIN — IPRATROPIUM BROMIDE 0.5 MG: 0.5 SOLUTION RESPIRATORY (INHALATION) at 01:02

## 2023-11-15 RX ADMIN — ASPIRIN 81 MG CHEWABLE TABLET 81 MG: 81 TABLET CHEWABLE at 08:58

## 2023-11-15 RX ADMIN — GUAIFENESIN 600 MG: 600 TABLET, EXTENDED RELEASE ORAL at 21:30

## 2023-11-15 RX ADMIN — TRAZODONE HYDROCHLORIDE 50 MG: 50 TABLET ORAL at 21:30

## 2023-11-15 RX ADMIN — AZITHROMYCIN DIHYDRATE 500 MG: 250 TABLET, FILM COATED ORAL at 08:58

## 2023-11-15 RX ADMIN — FUROSEMIDE 40 MG: 40 TABLET ORAL at 08:59

## 2023-11-15 RX ADMIN — IPRATROPIUM BROMIDE 0.5 MG: 0.5 SOLUTION RESPIRATORY (INHALATION) at 19:53

## 2023-11-15 RX ADMIN — Medication 6 MG: at 21:30

## 2023-11-15 RX ADMIN — WATER 1000 MG: 1 INJECTION INTRAMUSCULAR; INTRAVENOUS; SUBCUTANEOUS at 15:28

## 2023-11-15 RX ADMIN — METOPROLOL TARTRATE 100 MG: 50 TABLET, FILM COATED ORAL at 03:19

## 2023-11-15 RX ADMIN — IPRATROPIUM BROMIDE 0.5 MG: 0.5 SOLUTION RESPIRATORY (INHALATION) at 13:11

## 2023-11-15 RX ADMIN — MIDODRINE HYDROCHLORIDE 5 MG: 5 TABLET ORAL at 18:12

## 2023-11-15 RX ADMIN — AMIODARONE HYDROCHLORIDE 200 MG: 200 TABLET ORAL at 08:58

## 2023-11-15 RX ADMIN — SERTRALINE HYDROCHLORIDE 25 MG: 50 TABLET ORAL at 09:10

## 2023-11-15 RX ADMIN — MIDODRINE HYDROCHLORIDE 5 MG: 5 TABLET ORAL at 12:54

## 2023-11-15 RX ADMIN — ARFORMOTEROL TARTRATE: 15 SOLUTION RESPIRATORY (INHALATION) at 19:53

## 2023-11-15 RX ADMIN — SODIUM CHLORIDE, PRESERVATIVE FREE 10 ML: 5 INJECTION INTRAVENOUS at 21:34

## 2023-11-15 RX ADMIN — POLYETHYLENE GLYCOL 3350 17 G: 17 POWDER, FOR SOLUTION ORAL at 08:57

## 2023-11-15 ASSESSMENT — PAIN SCALES - GENERAL
PAINLEVEL_OUTOF10: 0

## 2023-11-15 NOTE — PROGRESS NOTES
Units  0-4 Units SubCUTAneous TID     insulin lispro (HUMALOG) injection vial 0-4 Units  0-4 Units SubCUTAneous Nightly    glucose chewable tablet 16 g  4 tablet Oral PRN    dextrose bolus 10% 125 mL  125 mL IntraVENous PRN    Or    dextrose bolus 10% 250 mL  250 mL IntraVENous PRN    glucagon injection 1 mg  1 mg SubCUTAneous PRN    dextrose 10 % infusion   IntraVENous Continuous PRN    predniSONE (DELTASONE) tablet 40 mg  40 mg Oral Daily    sodium chloride flush 0.9 % injection 5-40 mL  5-40 mL IntraVENous 2 times per day    sodium chloride flush 0.9 % injection 5-40 mL  5-40 mL IntraVENous PRN    0.9 % sodium chloride infusion   IntraVENous PRN    potassium chloride 20 mEq/50 mL IVPB (Central Line)  20 mEq IntraVENous PRN    Or    potassium chloride 10 mEq/100 mL IVPB (Peripheral Line)  10 mEq IntraVENous PRN    magnesium sulfate 2000 mg in 50 mL IVPB premix  2,000 mg IntraVENous PRN    polyethylene glycol (GLYCOLAX) packet 17 g  17 g Oral Daily PRN    acetaminophen (TYLENOL) tablet 650 mg  650 mg Oral Q6H PRN    Or    acetaminophen (TYLENOL) suppository 650 mg  650 mg Rectal Q6H PRN    ondansetron (ZOFRAN-ODT) disintegrating tablet 4 mg  4 mg Oral Q8H PRN    Or    ondansetron (ZOFRAN) injection 4 mg  4 mg IntraVENous Q6H PRN    polyethylene glycol (GLYCOLAX) packet 17 g  17 g Oral Daily    senna (SENOKOT) tablet 8.6 mg  1 tablet Oral Nightly    traZODone (DESYREL) tablet 50 mg  50 mg Oral Nightly    apixaban (ELIQUIS) tablet 5 mg  5 mg Oral BID    aspirin chewable tablet 81 mg  81 mg Oral Daily    arformoterol 15 mcg-budesonide 0.5 mg neb solution   Nebulization BID RT    melatonin tablet 6 mg  6 mg Oral QHS    sertraline (ZOLOFT) tablet 25 mg  25 mg Oral Daily    ipratropium (ATROVENT) 0.02 % nebulizer solution 0.5 mg  0.5 mg Nebulization Q6H       Labs:  ABG Invalid input(s):  \"ABG\"     CBC Recent Labs     11/13/23  0309 11/14/23  0423 11/15/23  0354   WBC 12.7* 16.3* 18.4*   HGB 11.9* 11.3* 11.3*   HCT 37.4

## 2023-11-15 NOTE — CONSULTS
Palliative medicine brief note- Full visit documentation pending. Follow up today to assess for anxiety/difficulty sleeping and to communicate with interdisciplinary team regarding his decision to discharge home with Hospice.

## 2023-11-15 NOTE — CARE COORDINATION
2:25 Pt's daughters have chosen [facility]. Pt will admit to home hospice services after 1:00 tomorrow. CM to schedule transport when the best time is known. Erinn Rivera [facility] [phone]      12:15 Pt's daughters requested hospice referrals be sent to [facility] and [facility]. [facility] was sent through [address] [place] [address]. Referral sent to [facility] through [facility]. Disposition: Home with Hospice   Transportation: 13:00 Thursday with [facility] [phone]  DC folder on hard chart   Attending dr and family updated. This CM received handoff from ICU CM. Hospital bed that was ordered through [facility] yesterday has been cancelled. Hospice will provide DME. CM to speak with family and pt about hospice choice and referral(s) will be sent.     ASHLY Vallejo

## 2023-11-15 NOTE — PLAN OF CARE
established plan of care. Recommendation for discharge: (in order for the patient to meet his/her long term goals): Therapy up to 5 days/week in Skilled nursing facility, if unable requires assistance for out of bed mobility    Other factors to consider for discharge: lives alone and concern for safely navigating or managing the home environment    IF patient discharges home will need the following DME: continuing to assess with progress       SUBJECTIVE:   Patient stated, \"I don't know how that will go but we can try. \" In response to therapist asking if he would like to get up to the recliner. OBJECTIVE DATA SUMMARY:   Critical Behavior:  Orientation  Overall Orientation Status: Within Normal Limits  Orientation Level: Oriented X4  Cognition  Overall Cognitive Status: WNL    Functional Mobility Training:  Bed Mobility:  Bed Mobility Training  Bed Mobility Training: Yes  Supine to Sit: Moderate assistance  Sit to Supine: Moderate assistance  Scooting: Moderate assistance  Transfers:  Transfer Training  Transfer Training: Yes  Interventions: Safety awareness training; Tactile cues; Verbal cues; Weight shifting training/pressure relief  Sit to Stand: Moderate assistance;Minimum assistance;Assist X2  Stand to Sit: Minimum assistance; Moderate assistance;Assist X2  Stand Pivot Transfers: Minimum assistance; Moderate assistance;Assist X2  Bed to Chair: Minimum assistance; Moderate assistance;Assist X2  Balance:  Balance  Sitting: Intact  Sitting - Static: Good (unsupported)  Sitting - Dynamic: Good (unsupported)  Standing: Impaired  Standing - Static: Good;Constant support  Standing - Dynamic: Fair;Constant support     Pain Ratin/10     Activity Tolerance:   Fair , requires rest breaks, desaturates with activity and requires oxygen, and BP as below, HR stable between  throughout session with mobility and SpO2 > 90% on 7L O2      11/15/23 1336 11/15/23 1339 11/15/23 1342   Vital Signs   Pulse 90  --   --    BP (!) 82/66 95/76 97/69   MAP (Calculated) 71 82 78   BP Location Left upper arm Left upper arm Left upper arm   BP Method Automatic Automatic Automatic   Patient Position Semi fowlers Sitting Sitting  (After standing)      11/15/23 1347   Vital Signs   Pulse  --    /68   MAP (Calculated) 82   BP Location Left upper arm   BP Method Automatic   Patient Position Sitting;Up in chair  (After transfer to recliner; LEs elevated)       After treatment:   Patient left in no apparent distress sitting up in chair, Call bell within reach, Bed/ chair alarm activated, and RN aware      COMMUNICATION/EDUCATION:   The patient's plan of care was discussed with: occupational therapist and registered nurse    Patient Education  Education Given To: Patient  Education Provided: Role of Therapy;Plan of Care;Energy Conservation;Transfer Training;Home Exercise Program  Education Method: Verbal  Barriers to Learning: None  Education Outcome: Verbalized understanding      Glory Barriga, PT  Minutes: 24

## 2023-11-15 NOTE — PROGRESS NOTES
Hospitalist Progress Note  Armaan Gray MD  Answering service: [phone]        Date of Service:  11/15/2023  NAME:  Jason Thao  :  1955  MRN:  [id number]      Admission Summary:     Patient admitted with COPD exacerbation and A-fib RVR. Interval history / Subjective:     No acute complaint. Assessment & Plan:     Acute respiratory failure  -Acute hypoxic and hypercapnic respiratory failure due to COPD exacerbation  -Patient previously required BiPAP, admitted to ICU from  to   -Oxygen weaned to nasal cannula, manage underlying etiologies monitor respiratory status    Acute exacerbation of COPD  -Chest x-ray shows right pleural effusion right basilar atelectasis  -Continue bronchodilators, steroid and breathing treatment  -On empiric antibiotics with Rocephin and Zithromax    Atrial fibrillation with rapid ventricular response  -On amiodarone, metoprolol and digoxin, on Eliquis for anticoagulation  -Plan for DCCV today    Congestive heart failure  -Chronic HFrEF, patient with EF of 40 to 45%  -Limited GDMT's due to hypotension  -Blood pressure support with midodrine    Right pleural effusion  -Pulmonology managing, on Lasix    Anxiety/depression  -Continue SSRI and trazodone     Code status: Full  Prophylaxis: Eliquis  Care Plan discussed with: Patient  Anticipated Disposition: Plan is for patient to go home with home hospice once DME set up is complete. Central Line:   None             Review of Systems:   Pertinent items are noted in HPI. Vital Signs:    Last 24hrs VS reviewed since prior progress note.  Most recent are:  Vitals:    11/15/23 0746   BP:    Pulse: 88   Resp: 21   Temp:    SpO2: 94%         Intake/Output Summary (Last 24 hours) at 11/15/2023 0753  Last data filed at 11/15/2023 0656  Gross per 24 hour   Intake 840 ml   Output 450 ml   Net 390 ml        Physical

## 2023-11-15 NOTE — PROGRESS NOTES
[address] [facility]  [phone]     Patient Name: Philip Trevino  YOB: 1955  Age: 77 y.o. [address] [facility] RN Note:  Hospice consult received, reviewing chart. Will follow up with Unit Nurse and Care Manager to discuss plan of care, patient status and discharge disposition within the hour. Palliative care team has provided medical background and goals of care to Hospice team. Hospice consult received from [facility]. Plan to review patient with Dr. Marbella Mason for hospice plan of care. Thank you for the opportunity to be of service to this patient. 13:00: Bedside with patient. Appears to be alert, oriented and decisional. Pt states that he plans to return home where he lives alone. Pt states his daughters will help him. Pt gave permission for hospice team to contact his daughters to arrange a bedside Hospice meeting. Pt said to call Cecilia Beaulieu. Pt states that he is not sure when he will be discharged from [facility], and said that he \"thinks\" he will discharge on Friday. 13:10: Call made to pt daughter, Cecilia Beaulieu. Her sister, Vargas Ramos was heard also on the phone. Daughters said they could not meet bedside until Friday. Offered to have phone meeting bedside later this afternoon or in the am.     PLAN: Daughters agree to having a phone meeting that will include patient this afternoon around 16:00. [facility] can service this patient and family at his home address, and is able to admit him into hospice care at his home tomorrow, 11/16, if pt/family in agreement. 15:30: Call made to pt daughter, Cecilia Beaulieu. [facility] told Hospice RN that family have decided to use [address] [place] [address] and that pt will be discharged tomorrow with home admission at 2:00 pm.    17:00: Bedside. Pt sitting in bedside chair. Education provided as to why Hospice RN did not meet with him at 4:00 pm with his daughters via phone as planned.  Offered that call was made

## 2023-11-15 NOTE — CONSULTS
Palliative Medicine  Patient Name: Rafael Sanchez  YOB: 1955  MRN: [id number]  Age: 77 y.o. Gender: male    Date of Initial Consult: 7/14/2023  Date of Service: 11/14/2023  Time: 1600 hrs. Provider: Helio Cross Day: 3  Admit Date: 11/12/2023  Referring Provider: JONAH Birmingham      Reasons for Consultation:  Goals of Care    HISTORY OF PRESENT ILLNESS (HPI):   Rafael Sanchez is a 77 y.o. male with a past medical history of CHF, COPD, A-fib, EF 40-45%, mild TR, moderate pulmonary hypertension, who was admitted on 11/12/2023 from SNF with a diagnosis of A-fib with RVR, hypoxic/hypercapnic respiratory failure  And bilateral pleural effusions. Mr. Gudelia Jerome was recently hospitalized at [facility] 10/13 - 10/25/2023 for COPD exacerbation, requiring intubation and ventilator support. Psychosocial: , 2 daughters who are identical twins, few grandchildren      PALLIATIVE DIAGNOSES:    Palliative care encounter  Shortness of breath  Weakness, generalized  Advance care planning discussion  DNR discussion  Goals of care discussion      Pertinent Hospital Diagnoses:  Principal Problem:    COPD exacerbation ([address])  Resolved Problems:    * No resolved hospital problems. *      ASSESSMENT AND PLAN:   Today, I seeing Mr. Rafael Sanchez for [employer] Discussion. .   Mr. Gudelia Jerome was seen and evaluated, his 2 daughters were at bedside. Introduced myself and role of Palliative Medicine. Assessed patients understanding of not only hospitalization, but also his understanding of his chronic underlying comorbidities. Mr. Gudelia Jerome shared that he has had multiple hospitalizations over the past several months and that he is getting tired and he just wants to be at home. We discussed option of comfort focused care with Hospice. Patient discussed with his daughters and made decision that he would like to be discharged back to his house with his daughters and Hospice.    Goals of care discussion-Home with hospice. Patient's daughters planning on staying with him to assist with his care as needed. I placed Order for CM to consult Hospice. *Patient will need Hospital bed, wheel chair, bedside commode, over bed table. DNR discussion- Mr. Rory Robles elected DNR/DNI. Recommend he completes Durable DNR prior to discharge. Mr. Rory Robles expressed concern that he will not get a good night sleep tonight, feeling mildly anxious after discussion. O. rder placed for Lorazepam 0.5mg IV every 4 hours as needed. Initial consult note routed to primary continuity provider and/or primary health care team members  Provided update to overnight NP Analy and patient.s daytime nurse. Please call with any palliative questions or concerns. Palliative Care Team is available via perfect serve or via phone. Referrals to:   [] Outpatient Palliative Care  [] Home Based Palliative Care  [] Home Based Primary Care  [x] Hospice       ADVANCE CARE PLANNING:   [x] [facility] Interdisciplinary Team has updated the ACP Navigator with Health Care Decision Maker and Patient Capacity      Primary Decision Maker: Yashira Garcia - Child - [phone]    Primary Decision Maker: Lachelle Gamino - Child - [phone]       Current Code Status: DNR     Goals of Care: Home with Hospice     Please refer to Palliative Medicine ACP notes for further details.     PALLIATIVE ASSESSMENT:      Palliative Performance Scale (PPS):  PPS: 30    ECOG:   ECOG Status : Limited self-care [3]    Modified ESAS:  Modified-[place] Symptom Assessment Scale (ESAS)  Tiredness Score: 8  Drowsiness Score: Not drowsy  Depression Score: Not depressed  Pain Score: 3  Anxiety Score: 5  Nausea Score: Not nauseated  Appetite Score: 4  Dyspnea Score: 7  Wellbeing Score: 6    Clinical Pain Assessment (nonverbal scale for severity on nonverbal patients):   Clinical Pain Assessment  Severity: 3  Location: bilateral knees  Character: aching  Duration: years  Factors: standing,

## 2023-11-15 NOTE — PROGRESS NOTES
[facility] Cardiology Progress Note    Date of service: 11/15/2023    Subjective:   His breathing has improved  He has decided he wants to be DNR and to be discharged to home hospice. Objective:    Vitals:    11/15/23 0746   BP:    Pulse: 88   Resp: 21   Temp:    SpO2: 94%       Physical Exam  Vitals reviewed. Constitutional:       General: He is not in acute distress. HENT:      Head: Normocephalic. Eyes:      Conjunctiva/sclera: Conjunctivae normal.   Cardiovascular:      Rate and Rhythm: Normal rate. Rhythm irregularly irregular. Heart sounds: Normal heart sounds. No murmur heard. Pulmonary:      Effort: Pulmonary effort is normal. No respiratory distress. Breath sounds: Wheezing present. Abdominal:      General: Abdomen is flat. Palpations: Abdomen is soft. Musculoskeletal:         General: No swelling or deformity. Skin:     General: Skin is warm and dry. Neurological:      General: No focal deficit present. Mental Status: He is alert. Mental status is at baseline.          Data reviewed:  Recent Results (from the past 12 hour(s))   POCT Glucose    Collection Time: 11/14/23  8:40 PM   Result Value Ref Range    POC Glucose 132 (H) 65 - 117 mg/dL    Performed by: Cate De La Cruz    CBC with Auto Differential    Collection Time: 11/15/23  3:54 AM   Result Value Ref Range    WBC 18.4 (H) 4.1 - 11.1 K/uL    RBC 3.55 (L) 4.10 - 5.70 M/uL    Hemoglobin 11.3 (L) 12.1 - 17.0 g/dL    Hematocrit 36.2 (L) 36.6 - 50.3 %    .0 (H) 80.0 - 99.0 FL    MCH 31.8 26.0 - 34.0 PG    MCHC 31.2 30.0 - 36.5 g/dL    RDW 14.6 (H) 11.5 - 14.5 %    Platelets 486 512 - 410 K/uL    MPV 9.6 8.9 - 12.9 FL    Nucleated RBCs 0.0 0  WBC    nRBC 0.00 0.00 - 0.01 K/uL    Neutrophils % 86 (H) 32 - 75 %    Lymphocytes % 6 (L) 12 - 49 %    Monocytes % 7 5 - 13 %    Eosinophils % 0 0 - 7 %    Basophils % 0 0 - 1 %    Immature Granulocytes 1 (H) 0.0 - 0.5 %    Neutrophils Absolute 15.9 (H) 1.8 - 8.0 K/UL

## 2023-11-15 NOTE — PROGRESS NOTES
Received patient ,downgrade from ICU, patient alert oriented x4 , denies pain, no sign of distress observed. Pt on oxygen 4 l,sats above 92%, other vital signs stable (see flow sheet). Patient is scheduled for cardioversion tomorrow. Patient states\" they want to work on my heart tomorrow but will not take it\"  Patient verbalized understanding why he needs the procedure but he states \" I am positive that I do not want to have the procedure, my heart  will be fine\". 0130:Patient declined NPO status and requested to have water. Pt given one cup of water.

## 2023-11-15 NOTE — PLAN OF CARE
Problem: Occupational Therapy - Adult  Goal: By Discharge: Performs self-care activities at highest level of function for planned discharge setting. See evaluation for individualized goals. Description: FUNCTIONAL STATUS PRIOR TO ADMISSION:  At true baseline, patient reports being independent with functional mobility and ADLs. Increased assistance needed since recent hospitalization at [facility] (10/13-10/25) for COPD exacerbation with period of intubation and ventilator support. Receives Help From: Family, ADL Assistance: Needs assistance, Bath: Moderate assistance, Dressing: Moderate assistance, Grooming: Moderate assistance, Feeding: Independent, Toileting: Needs assistance,  , Ambulation Assistance: Needs assistance, Transfer Assistance: Needs assistance, Active : Yes (active  prior to October)     HOME SUPPORT: Patient lived alone. Occupational Therapy Goals:  Initiated 11/15/2023  1. Patient will perform grooming routine in seated/standing with Modified [place] within 7 day(s). 2.  Patient will perform seated/standing bathing with Contact Guard Assist within 7 day(s). 3.  Patient will perform lower body dressing with Contact Guard Assist within 7 day(s). 4.  Patient will perform toilet transfers with Contact Guard Assist using RW prn within 7 day(s). 5.  Patient will perform all aspects of toileting with Contact Guard Assist within 7 day(s). 6.  Patient will utilize energy conservation techniques during functional activities without cues within 7 day(s).       Outcome: Progressing   OCCUPATIONAL THERAPY EVALUATION    Patient: Jose Soni (7 y.o. male)  Date: 11/15/2023  Primary Diagnosis: COPD exacerbation ([address]) [J44.1]  Atrial fibrillation with rapid ventricular response (HCC) [I48.91]  Procedure(s) (LRB):  Ep cardioversion (N/A) Day of Surgery     Precautions: Fall Risk                  ASSESSMENT :  The patient is limited by decreased functional mobility, independence in ADLs,

## 2023-11-15 NOTE — PROGRESS NOTES
Referral source:   oCrby Maza at [facility] in [facility] [place] [facility] I[facility] 2.  attended rounds in the CCU as part of the Interdisciplinary team where the patient's ongoing care was discussed. I reviewed the medical record as part of this encounter. Outcome: Interdisciplinary team are aware of  availability and were encouraged to request support as needed. Advised nurse to contact [address] [place] [address] for any further referrals. The  on-call can be reached at [phone]). Rev.  Cory Bauman MDiv, [facility]  Staff

## 2023-11-15 NOTE — PLAN OF CARE
Problem: Discharge Planning  Goal: Discharge to home or other facility with appropriate resources  11/15/2023 1326 by Amaya Ochoa RN  Outcome: Progressing  11/15/2023 0039 by Agnieszka Middleton RN  Outcome: Progressing     Problem: Pain  Goal: Verbalizes/displays adequate comfort level or baseline comfort level  11/15/2023 1326 by Amaya Ochoa RN  Outcome: Progressing  11/15/2023 0039 by Agnieszka Middleton RN  Outcome: Progressing     Problem: Safety - Adult  Goal: Free from fall injury  11/15/2023 1326 by Amaya Ochoa RN  Outcome: Progressing  11/15/2023 0039 by Agnieszka Middleton RN  Outcome: Progressing     Problem: Skin/Tissue Integrity  Goal: Absence of new skin breakdown  Description: 1. Monitor for areas of redness and/or skin breakdown  2. Assess vascular access sites hourly  3. Every 4-6 hours minimum:  Change oxygen saturation probe site  4. Every 4-6 hours:  If on nasal continuous positive airway pressure, respiratory therapy assess nares and determine need for appliance change or resting period.   11/15/2023 1326 by Amaya Ochoa RN  Outcome: Progressing  11/15/2023 0039 by Agnieszka Middleton RN  Outcome: Progressing     Problem: ABCDS Injury Assessment  Goal: Absence of physical injury  11/15/2023 1326 by Amaya Ochoa RN  Outcome: Progressing  11/15/2023 0039 by Agnieszka Middleton RN  Outcome: Progressing

## 2023-11-16 VITALS
SYSTOLIC BLOOD PRESSURE: 100 MMHG | WEIGHT: 239.3 LBS | HEART RATE: 70 BPM | HEIGHT: 76 IN | RESPIRATION RATE: 16 BRPM | DIASTOLIC BLOOD PRESSURE: 64 MMHG | BODY MASS INDEX: 29.14 KG/M2 | OXYGEN SATURATION: 91 % | TEMPERATURE: 97.5 F

## 2023-11-16 PROBLEM — Z71.1 CONCERN ABOUT END OF LIFE: Status: ACTIVE | Noted: 2023-11-16

## 2023-11-16 PROBLEM — F41.9 ANXIETY: Status: ACTIVE | Noted: 2023-11-16

## 2023-11-16 LAB
GLUCOSE BLD STRIP.AUTO-MCNC: 116 MG/DL (ref 65–117)
SERVICE CMNT-IMP: NORMAL

## 2023-11-16 PROCEDURE — 94760 N-INVAS EAR/PLS OXIMETRY 1: CPT

## 2023-11-16 PROCEDURE — 6370000000 HC RX 637 (ALT 250 FOR IP): Performed by: STUDENT IN AN ORGANIZED HEALTH CARE EDUCATION/TRAINING PROGRAM

## 2023-11-16 PROCEDURE — 82962 GLUCOSE BLOOD TEST: CPT

## 2023-11-16 PROCEDURE — 6370000000 HC RX 637 (ALT 250 FOR IP): Performed by: NURSE PRACTITIONER

## 2023-11-16 PROCEDURE — 6370000000 HC RX 637 (ALT 250 FOR IP): Performed by: HOSPITALIST

## 2023-11-16 PROCEDURE — 94660 CPAP INITIATION&MGMT: CPT

## 2023-11-16 PROCEDURE — 2580000003 HC RX 258: Performed by: NURSE PRACTITIONER

## 2023-11-16 PROCEDURE — 6370000000 HC RX 637 (ALT 250 FOR IP): Performed by: INTERNAL MEDICINE

## 2023-11-16 PROCEDURE — 2700000000 HC OXYGEN THERAPY PER DAY

## 2023-11-16 PROCEDURE — 94761 N-INVAS EAR/PLS OXIMETRY MLT: CPT

## 2023-11-16 RX ORDER — DIGOXIN 125 MCG
125 TABLET ORAL DAILY
Qty: 30 TABLET | Refills: 3 | Status: SHIPPED | OUTPATIENT
Start: 2023-11-16

## 2023-11-16 RX ORDER — MIDODRINE HYDROCHLORIDE 5 MG/1
5 TABLET ORAL
Qty: 90 TABLET | Refills: 0 | Status: SHIPPED | OUTPATIENT
Start: 2023-11-16

## 2023-11-16 RX ORDER — AMIODARONE HYDROCHLORIDE 200 MG/1
200 TABLET ORAL DAILY
Qty: 30 TABLET | Refills: 0 | Status: SHIPPED | OUTPATIENT
Start: 2023-11-16 | End: 2023-12-16

## 2023-11-16 RX ORDER — DOXYCYCLINE HYCLATE 100 MG
100 TABLET ORAL EVERY 12 HOURS SCHEDULED
Status: DISCONTINUED | OUTPATIENT
Start: 2023-11-16 | End: 2023-11-16 | Stop reason: HOSPADM

## 2023-11-16 RX ADMIN — APIXABAN 5 MG: 5 TABLET, FILM COATED ORAL at 09:04

## 2023-11-16 RX ADMIN — MIDODRINE HYDROCHLORIDE 5 MG: 5 TABLET ORAL at 12:19

## 2023-11-16 RX ADMIN — SERTRALINE HYDROCHLORIDE 25 MG: 50 TABLET ORAL at 09:07

## 2023-11-16 RX ADMIN — CASTOR OIL AND BALSAM, PERU: 788; 87 OINTMENT TOPICAL at 09:10

## 2023-11-16 RX ADMIN — MIDODRINE HYDROCHLORIDE 5 MG: 5 TABLET ORAL at 09:04

## 2023-11-16 RX ADMIN — FUROSEMIDE 40 MG: 40 TABLET ORAL at 09:04

## 2023-11-16 RX ADMIN — DIGOXIN 125 MCG: 250 TABLET ORAL at 09:04

## 2023-11-16 RX ADMIN — GUAIFENESIN 600 MG: 600 TABLET, EXTENDED RELEASE ORAL at 09:04

## 2023-11-16 RX ADMIN — DOXYCYCLINE HYCLATE 100 MG: 100 TABLET, COATED ORAL at 10:19

## 2023-11-16 RX ADMIN — AMIODARONE HYDROCHLORIDE 200 MG: 200 TABLET ORAL at 09:05

## 2023-11-16 RX ADMIN — SODIUM CHLORIDE, PRESERVATIVE FREE 10 ML: 5 INJECTION INTRAVENOUS at 09:08

## 2023-11-16 RX ADMIN — PREDNISONE 40 MG: 20 TABLET ORAL at 08:59

## 2023-11-16 RX ADMIN — ASPIRIN 81 MG CHEWABLE TABLET 81 MG: 81 TABLET CHEWABLE at 09:08

## 2023-11-16 RX ADMIN — POLYETHYLENE GLYCOL 3350 17 G: 17 POWDER, FOR SOLUTION ORAL at 08:59

## 2023-11-16 NOTE — CARE COORDINATION
UAI completed by Kirstie on 10/25/2023- CM emailed   [email or web address] to request a copy.      KAREN Pearson

## 2023-11-16 NOTE — PROGRESS NOTES
RT Note:  NIV Assessment     11/16/23 0038   NIV Type   NIV Started/Stopped On   Equipment Type V60   Mode Bilevel   Mask Type Full face mask   Mask Size Large   Assessment   Pulse 90   Respirations 11   SpO2 93 %   Level of Consciousness 1   Comfort Level Good   Using Accessory Muscles No   Mask Compliance Good   Skin Assessment Clean, dry, & intact   Skin Protection for O2 Device No   Settings/Measurements   PIP Observed 16 cm H20   IPAP (S)  16 cmH20  (weaned d/t high volumes)   CPAP/EPAP 8 cmH2O   Vt (Measured) 664 mL   Rate Ordered 4   Insp Rise Time (%) 3 %   FiO2  30 %   I Time/ I Time % 1.2 s   Minute Volume (L/min) 12.9 Liters   Mask Leak (lpm) 5 lpm   Patient's Home Machine No   Alarm Settings   Alarms On Y

## 2023-11-16 NOTE — CARE COORDINATION
Transition of Care Plan:    RUR: 17%   Prior Level of Functioning: Min-Mod A   Disposition: Home with Hospice   [facility]; Liaison Derik December [phone]  Per conversation with the liaison; she reported that they would admit the pt at 2pm.   Family inquired about a Uai being completed for the pt. This cm informed them that he would send a request to the TL  DME needed:  To be provided by the Hospice Agency    Transportation at discharge: [facility] Medicare/Elvira letter given: Received 11/15  Caregiver Contact: [address] (Child)   [phone]

## 2023-11-16 NOTE — DISCHARGE INSTRUCTIONS
Discharge Instructions       PATIENT ID: Boom Vasquez  MRN: [id number]   YOB: 1955    DATE OF ADMISSION: 11/12/2023   DATE OF DISCHARGE: 11/16/2023    PRIMARY CARE PROVIDER: No primary care provider on file. ATTENDING PHYSICIAN: Marie Meek MD   DISCHARGING PROVIDER: Andreina Rosado MD    To contact this individual call [phone] and ask the  to page. If unavailable ask to be transferred the Adult Hospitalist Department. DISCHARGE DIAGNOSES     Acute on chronic hypoxic and hypercarbic respiratory failure due to COPD exacerbation. Atrial fibrillation with rapid ventricular response  Chronic systolic heart failure         CONSULTATIONS: You were seen and followed by cardiology, pulmonary, palliative and hospice team  PROCEDURES/SURGERIES: Procedure(s):  Ep cardioversion    PENDING TEST RESULTS:   At the time of discharge the following test results are still pending: None    FOLLOW UP APPOINTMENTS:   Once you are home, the hospice team would coordinate your care. ADDITIONAL CARE RECOMMENDATIONS:     DIET: regular diet    ACTIVITY: activity as tolerated        EQUIPMENT needed:per hospice team      DISCHARGE MEDICATIONS:   See Medication Reconciliation Form    It is important that you take the medication exactly as they are prescribed. Keep your medication in the bottles provided by the pharmacist and keep a list of the medication names, dosages, and times to be taken in your wallet. Do not take other medications without consulting your doctor. NOTIFY YOUR PHYSICIAN FOR ANY OF THE FOLLOWING:   Fever over 101 degrees for 24 hours. Chest pain, shortness of breath, fever, chills, nausea, vomiting, diarrhea, change in mentation, falling, weakness, bleeding. Severe pain or pain not relieved by medications. Or, any other signs or symptoms that you may have questions about. DISPOSITION: Home with home hospice services        Signed:    Andreina Rosado MD  11/16/2023  12:22 PM

## 2023-11-16 NOTE — PLAN OF CARE
Problem: Skin/Tissue Integrity  Goal: Absence of new skin breakdown  Description: 1. Monitor for areas of redness and/or skin breakdown  2. Assess vascular access sites hourly  3. Every 4-6 hours minimum:  Change oxygen saturation probe site  4. Every 4-6 hours:  If on nasal continuous positive airway pressure, respiratory therapy assess nares and determine need for appliance change or resting period.   11/16/2023 1234 by Timothy Melgar RN  Outcome: [address] [facility] 114 Progressing  11/16/2023 0019 by Frances Del Real RN  Outcome: Progressing     Problem: Discharge Planning  Goal: Discharge to home or other facility with appropriate resources  11/16/2023 1234 by Timothy Melgar RN  Outcome: [facility] 114 Resolved Met  11/16/2023 0019 by Frances Del Real RN  Outcome: Progressing     Problem: Pain  Goal: Verbalizes/displays adequate comfort level or baseline comfort level  11/16/2023 1234 by Timothy Melgar RN  Outcome: [address] [facility] 114 Resolved Met  11/16/2023 0019 by Frances Del Real RN  Outcome: Progressing     Problem: Safety - Adult  Goal: Free from fall injury  11/16/2023 1234 by Timothy Melgar RN  Outcome: [address] [facility] 114 Resolved Met  11/16/2023 0019 by Frances Del Real RN  Outcome: Progressing     Problem: ABCDS Injury Assessment  Goal: Absence of physical injury  11/16/2023 1234 by Timothy Melgar RN  Outcome: [address] [facility] 114 Resolved Met  11/16/2023 0019 by Frances Del Real RN  Outcome: Progressing

## 2023-11-16 NOTE — DISCHARGE SUMMARY
Discharge Summary       PATIENT ID: Annie Thomas  MRN: [id number]   YOB: 1955    DATE OF ADMISSION: 11/12/2023 10:13 AM    DATE OF DISCHARGE: 11/16/23     PRIMARY CARE PROVIDER: No primary care provider on file. ATTENDING PHYSICIAN: Marvin Mejias MD    DISCHARGING PROVIDER: Marvin Mejias MD    To contact this individual call [phone] and ask the  to page. If unavailable ask to be transferred the Adult Hospitalist Department. CONSULTATIONS: IP WOUND CARE NURSE CONSULT TO EVAL  IP CONSULT TO PALLIATIVE CARE  IP CONSULT TO PULMONOLOGY  IP CONSULT TO CASE MANAGEMENT  IP CONSULT TO CASE MANAGEMENT    PROCEDURES/SURGERIES: Procedure(s):  Ep cardioversion     ADMITTING DIAGNOSES & HOSPITAL COURSE:     Patient admitted with COPD exacerbation and A-fib RVR. Acute respiratory failure, improved  Acute COPD exacerbation, improved  -Acute hypoxic and hypercapnic respiratory failure due to COPD exacerbation  -Patient previously required BiPAP, admitted to ICU from 11/12 to 11/14  -Treated with bronchodilators, steroids and empiric antibiotics. -Oxygen weaned to nasal cannula    Atrial fibrillation with rapid ventricular response  -Followed by cardiologist.  On amiodarone, metoprolol and digoxin, on Eliquis for anticoagulation  -Patient declined cardioversion, elected to go home with hospice services.     Congestive heart failure  -Chronic HFrEF, patient with EF of 40 to 45%  -Limited GDMT's due to hypotension  -Blood pressure support with midodrine     Right pleural effusion  -Pulmonology managing, on Lasix     Anxiety/depression  -Continue SSRI and trazodone       Disposition: Home with home health services      PENDING TEST RESULTS:   At the time of discharge the following test results are still pending: None    FOLLOW UP APPOINTMENTS:    Follow-up Information    None           ADDITIONAL CARE RECOMMENDATIONS:     Diet: regular   ACTIVITY: activity as tolerated    WOUND CARE:

## 2023-11-16 NOTE — PLAN OF CARE
Problem: Pain  Goal: Verbalizes/displays adequate comfort level or baseline comfort level  11/16/2023 0019 by Julian Pat RN  Outcome: Progressing  11/15/2023 1326 by Mague Freeman RN  Outcome: Progressing     Problem: Safety - Adult  Goal: Free from fall injury  11/16/2023 0019 by Julian Pat RN  Outcome: Progressing  11/15/2023 1326 by Mague Freeman RN  Outcome: Progressing     Problem: Skin/Tissue Integrity  Goal: Absence of new skin breakdown  Description: 1. Monitor for areas of redness and/or skin breakdown  2. Assess vascular access sites hourly  3. Every 4-6 hours minimum:  Change oxygen saturation probe site  4. Every 4-6 hours:  If on nasal continuous positive airway pressure, respiratory therapy assess nares and determine need for appliance change or resting period.   11/16/2023 0019 by Julian Pat RN  Outcome: Progressing  11/15/2023 1326 by Mague Freeman RN  Outcome: Progressing

## 2023-11-16 NOTE — PROGRESS NOTES
Education was provided to patient. Patient  verbalized understanding. Patient had no further questions. AVS was given to the patient. Awaiting transport.

## 2023-11-16 NOTE — PROGRESS NOTES
Pt transferred via bed to room 540-2, belongings with pt. Accompanied by staff & RN. Pt tolerated the transfer.

## 2023-11-17 LAB
BACTERIA SPEC CULT: NORMAL
BACTERIA SPEC CULT: NORMAL
SERVICE CMNT-IMP: NORMAL
SERVICE CMNT-IMP: NORMAL

## 2023-11-17 NOTE — CARE COORDINATION
Medicaid [place] Screening [place] for [place] on the [facility] [place].      KAREN Coy

## 2023-11-19 PROBLEM — J44.9 COPD (CHRONIC OBSTRUCTIVE PULMONARY DISEASE) (HCC): Status: ACTIVE | Noted: 2023-11-19
